# Patient Record
Sex: FEMALE | Race: WHITE | NOT HISPANIC OR LATINO | Employment: FULL TIME | URBAN - METROPOLITAN AREA
[De-identification: names, ages, dates, MRNs, and addresses within clinical notes are randomized per-mention and may not be internally consistent; named-entity substitution may affect disease eponyms.]

---

## 2017-02-13 RX ORDER — CITALOPRAM 20 MG/1
TABLET, FILM COATED ORAL
Qty: 90 TABLET | Refills: 0 | OUTPATIENT
Start: 2017-02-13

## 2017-02-20 RX ORDER — CITALOPRAM 20 MG/1
TABLET, FILM COATED ORAL
Qty: 90 TABLET | Refills: 0 | Status: SHIPPED | OUTPATIENT
Start: 2017-02-20 | End: 2017-05-22 | Stop reason: SDUPTHER

## 2017-02-20 NOTE — TELEPHONE ENCOUNTER
----- Message from Sana Vidal sent at 2/17/2017  3:32 PM CST -----  Contact: Self  Former Pt of  is calling in regards of seeing if she can get her  CELEXA filled until she comes in 03/13/2017. The pt uses [facility] on [address]. The pt can be reached at [phone]. Thanks KG

## 2017-02-20 NOTE — TELEPHONE ENCOUNTER
Left message to patient to call the office at [phone] to informed patient medication was sent to the pharmacy.

## 2017-02-27 ENCOUNTER — PATIENT MESSAGE (OUTPATIENT)
Dept: OBSTETRICS AND GYNECOLOGY | Facility: CLINIC | Age: 27
End: 2017-02-27

## 2017-03-21 ENCOUNTER — PATIENT MESSAGE (OUTPATIENT)
Dept: DERMATOLOGY | Facility: CLINIC | Age: 27
End: 2017-03-21

## 2017-04-06 ENCOUNTER — TELEPHONE (OUTPATIENT)
Dept: OPTOMETRY | Facility: CLINIC | Age: 27
End: 2017-04-06

## 2017-04-06 NOTE — TELEPHONE ENCOUNTER
----- Message from eHlen Palacios sent at 4/5/2017 12:55 PM CDT -----  Contact: Father   Pt father calling with regard to his daughter and getting a detailed letter for his insurance company about his daughters condition; that will allow her to receive coverage over the age of 26.  She was born with strabismus and can not drive so he is trying to send an appeal on that ground.  He wants us to state in the letter her condition and the length of time she has had it and her vision with and without the corrective lenses.  He can be reached at [phone] ([facility])

## 2017-05-09 RX ORDER — CITALOPRAM 20 MG/1
TABLET, FILM COATED ORAL
Qty: 90 TABLET | Refills: 0 | OUTPATIENT
Start: 2017-05-09

## 2017-05-22 ENCOUNTER — OFFICE VISIT (OUTPATIENT)
Dept: OBSTETRICS AND GYNECOLOGY | Facility: CLINIC | Age: 27
End: 2017-05-22
Payer: COMMERCIAL

## 2017-05-22 ENCOUNTER — PATIENT MESSAGE (OUTPATIENT)
Dept: OBSTETRICS AND GYNECOLOGY | Facility: CLINIC | Age: 27
End: 2017-05-22

## 2017-05-22 VITALS
HEIGHT: 63 IN | WEIGHT: 121.25 LBS | SYSTOLIC BLOOD PRESSURE: 112 MMHG | DIASTOLIC BLOOD PRESSURE: 62 MMHG | BODY MASS INDEX: 21.48 KG/M2

## 2017-05-22 DIAGNOSIS — N92.6 IRREGULAR PERIODS: Primary | ICD-10-CM

## 2017-05-22 DIAGNOSIS — F32.A DEPRESSION, UNSPECIFIED DEPRESSION TYPE: ICD-10-CM

## 2017-05-22 DIAGNOSIS — Z01.419 VISIT FOR GYNECOLOGIC EXAMINATION: Primary | ICD-10-CM

## 2017-05-22 PROCEDURE — 99395 PREV VISIT EST AGE 18-39: CPT | Mod: S$GLB,,, | Performed by: NURSE PRACTITIONER

## 2017-05-22 PROCEDURE — 99999 PR PBB SHADOW E&M-EST. PATIENT-LVL III: CPT | Mod: PBBFAC,,, | Performed by: NURSE PRACTITIONER

## 2017-05-22 PROCEDURE — 88175 CYTOPATH C/V AUTO FLUID REDO: CPT

## 2017-05-22 RX ORDER — MEDROXYPROGESTERONE ACETATE 10 MG/1
10 TABLET ORAL DAILY
Qty: 10 TABLET | Refills: 0 | Status: SHIPPED | OUTPATIENT
Start: 2017-05-22 | End: 2018-07-23

## 2017-05-22 RX ORDER — CITALOPRAM 20 MG/1
TABLET, FILM COATED ORAL
Qty: 90 TABLET | Refills: 4 | Status: SHIPPED | OUTPATIENT
Start: 2017-05-22 | End: 2018-07-23 | Stop reason: SDUPTHER

## 2017-05-22 NOTE — PROGRESS NOTES
HISTORY OF PRESENT ILLNESS:    Kiley Vyas is a 27 y.o. female, ., Patient's last menstrual period was 2017 (approximate).,  presents for a routine exam and Celexa refills for depression (initiated by Dr Dotson).  -No longer on OCPs as she is currently not sexually active.     Past Medical History:   Diagnosis Date    Depression     Seborrheic dermatitis     Strabismus        Past Surgical History:   Procedure Laterality Date    STRABISMUS SURGERY  1994    Denervation and extirpation of IO ou    STRABISMUS SURGERY  2010    Recession LR OD 6mm, Resection MR OD 5mm       MEDICATIONS AND ALLERGIES:      Current Outpatient Prescriptions:     citalopram (CELEXA) 20 MG tablet, TAKE 1 TABLET(20 MG) BY MOUTH EVERY DAY, Disp: 90 tablet, Rfl: 4    clobetasol (TEMOVATE) 0.05 % external solution, Use hs prn, Disp: 25 mL, Rfl: 3    econazole nitrate 1 % cream, Apply topically 2 (two) times daily., Disp: 30 g, Rfl: 3    fluocinolone acetonide oil (DERMOTIC OIL) 0.01 % Drop, Place 1 drop in ear(s) once daily., Disp: 20 mL, Rfl: 0    fluocinonide (LIDEX) 0.05 % external solution, Use on scalp hs, Disp: 60 mL, Rfl: 3    tazarotene (TAZORAC) 0.05 % Crea cream, Apply topically every evening., Disp: 30 g, Rfl: 3    Review of patient's allergies indicates:  No Known Allergies    Family History   Problem Relation Age of Onset    Colon cancer Mother 51    Diabetes Paternal Grandfather     Cataracts Maternal Grandmother     Breast cancer Neg Hx     Ovarian cancer Neg Hx        Social History     Social History    Marital status: Single     Spouse name: N/A    Number of children: N/A    Years of education: N/A     Occupational History    Not on file.     Social History Main Topics    Smoking status: Never Smoker    Smokeless tobacco: Never Used    Alcohol use Yes    Drug use: No    Sexual activity: Not Currently     Partners: Male     Birth control/ protection: None     Other Topics Concern  "   Not on file     Social History Narrative    No narrative on file       OB HISTORY: None.     COMPREHENSIVE GYN HISTORY:  PAP History: Denies abnormal Paps. LAST PAP  11-28-14 NORMAL.  Infection History: Denies STDs. Denies PID.  Benign History: Denies uterine fibroids. Denies ovarian cysts. Denies endometriosis. Denies other conditions.  Cancer History: Denies cervical cancer. Denies uterine cancer or hyperplasia. Denies ovarian cancer. Denies vulvar cancer or pre-cancer. Denies vaginal cancer or pre-cancer. Denies breast cancer. Denies colon cancer.  Sexual Activity History: Denies currently being sexually active  Menstrual History: Monthly. Mod then light flow.   Dysmenorrhea History: Reports mild dysmenorrhea.   Contraception: N/A.    ROS:  GENERAL: No weight changes. No swelling. No fatigue. No fever.  CARDIOVASCULAR: No chest pain. No shortness of breath. No leg cramps.   NEUROLOGICAL: No headaches. No vision changes.  BREASTS: No pain. No lumps. No discharge.  ABDOMEN: No pain. No nausea. No vomiting. No diarrhea. No constipation.  REPRODUCTIVE: No abnormal bleeding.   VULVA: No pain. No lesions. No itching.  VAGINA: No relaxation. No itching. No odor. No discharge. No lesions.  URINARY: No incontinence. No nocturia. No frequency. No dysuria.    /62   Ht 5' 3" (1.6 m)   Wt 55 kg (121 lb 4.1 oz)   LMP 04/25/2017 (Approximate)   BMI 21.48 kg/m²     PE:  APPEARANCE: Well nourished, well developed, in no acute distress.  AFFECT: WNL, alert and oriented x 3.  SKIN: No acne or hirsutism.  NECK: Neck symmetric, without masses or thyromegaly.  NODES: No inguinal, cervical, axillary or femoral lymph node enlargement.  CHEST: Good respiratory effort.   ABDOMEN: Soft. No tenderness or masses. No hepatosplenomegaly. No hernias.  BREASTS: Symmetrical, no skin changes, visible lesions, palpable masses or nipple discharge bilaterally.  PELVIC: External female genitalia without lesions.  Female hair " distribution. Adequate perineal body, Normal urethral meatus. Vagina moist and well rugated without lesions or discharge.  No significant cystocele or rectocele present. Cervix pink without lesions, discharge or tenderness. Uterus is 4-6 week size, regular, mobile and nontender. Adnexa without masses or tenderness.  EXTREMITIES: No edema    DIAGNOSIS:  1. Visit for gynecologic examination    2. Depression, unspecified depression type        PLAN:    Orders Placed This Encounter    Liquid-based pap smear, screening    citalopram (CELEXA) 20 MG tablet   Declined std tests    COUNSELING:  The patient was counseled today on:  -that if she changes her mind and wants back on OCPs to e-mail me for a prescription;  -Celexa use and potential side effectsl;  -A.C.S. Pap and pelvic exam guidelines (pap every 3 years);  -to follow up with her PCP for other health maintenance.    FOLLOW-UP with me or Dr Dotson annually.

## 2017-05-24 ENCOUNTER — PATIENT OUTREACH (OUTPATIENT)
Dept: ADMINISTRATIVE | Facility: HOSPITAL | Age: 27
End: 2017-05-24

## 2017-06-27 ENCOUNTER — TELEPHONE (OUTPATIENT)
Dept: DERMATOLOGY | Facility: CLINIC | Age: 27
End: 2017-06-27

## 2017-06-27 NOTE — TELEPHONE ENCOUNTER
Spoke to pt.Scheduled appt on 8/14 with Dr. Canas concerning a growth that does not go away after getting bitten by a bug.Sent reminder to pt.

## 2017-06-27 NOTE — TELEPHONE ENCOUNTER
----- Message from Ana Madrigallincoln sent at 6/27/2017  2:25 PM CDT -----  Contact: pts Father at [phone]  [place] pt-Pts father states that his daughter has a large growth on her arm that that was initially a bite.  The size of a dime.  Scratches it and it get irritated.  Wants it removed.  Mr. Vyas can be reached at [phone].  Wants sooner than September.  Thanks

## 2017-06-29 ENCOUNTER — TELEPHONE (OUTPATIENT)
Dept: DERMATOLOGY | Facility: CLINIC | Age: 27
End: 2017-06-29

## 2017-06-29 NOTE — TELEPHONE ENCOUNTER
----- Message from Silvia Keen RN sent at 6/29/2017  3:20 PM CDT -----  Contact: pts dad  Can you see if this patient can possibly be added in for July 11th? If not that's ok, just let me know. Thank you.     Silvia   ----- Message -----  From: Rachel Julien MA  Sent: 6/29/2017   1:23 PM  To: Silvia Keen RN    I spoke to pt, she was fine with the appt in August, but father is calling and concern about this area that is draining, do you know if Dr. Shultz have anything sooner.  ----- Message -----  From: Velia Watters  Sent: 6/29/2017   1:13 PM  To: Missael Valladares Staff    LINK-pt- pts dad is calling to speak with the nurse pt has a growth on her arm pts dad it was originally a bug bite pts dad said its draining and has clear fluid. PT has an appt in August pt needs to be seen before then. Can you please call pts dad at [phone]    ANTONIA

## 2017-06-29 NOTE — TELEPHONE ENCOUNTER
----- Message from Silvia Keen RN sent at 6/29/2017  3:20 PM CDT -----  Contact: pts dad  Can you see if this patient can possibly be added in for July 11th? If not that's ok, just let me know. Thank you.     Silvia   ----- Message -----  From: Rachel Julien MA  Sent: 6/29/2017   1:23 PM  To: Silvia Keen RN    I spoke to pt, she was fine with the appt in August, but father is calling and concern about this area that is draining, do you know if Dr. Shultz have anything sooner.  ----- Message -----  From: Velia Watters  Sent: 6/29/2017   1:13 PM  To: Missael Valladares Staff    LINK-pt- pts dad is calling to speak with the nurse pt has a growth on her arm pts dad it was originally a bug bite pts dad said its draining and has clear fluid. PT has an appt in August pt needs to be seen before then. Can you please call pts dad at [phone]    ANTONIA

## 2017-07-11 ENCOUNTER — OFFICE VISIT (OUTPATIENT)
Dept: DERMATOLOGY | Facility: CLINIC | Age: 27
End: 2017-07-11
Payer: COMMERCIAL

## 2017-07-11 VITALS — WEIGHT: 121 LBS | BODY MASS INDEX: 21.43 KG/M2

## 2017-07-11 DIAGNOSIS — L98.0 PYOGENIC GRANULOMA OF SKIN: Primary | ICD-10-CM

## 2017-07-11 PROCEDURE — 99999 PR PBB SHADOW E&M-EST. PATIENT-LVL III: CPT | Mod: PBBFAC,,, | Performed by: DERMATOLOGY

## 2017-07-11 PROCEDURE — 99213 OFFICE O/P EST LOW 20 MIN: CPT | Mod: S$GLB,,, | Performed by: DERMATOLOGY

## 2017-07-12 NOTE — PROGRESS NOTES
Subjective:       Patient ID:  Kiley Vyas is a 27 y.o. female who presents for   Chief Complaint   Patient presents with    Lesion     right arm      History of Present Illness: The patient presents with chief complaint of lesion.  Location: right arm  Duration: weeks  Signs/Symptoms: growing    Prior treatments: none    Her acne has resolved        Lesion         Review of Systems   Constitutional: Negative for fever.   Skin: Negative for itching and rash.   Hematologic/Lymphatic: Does not bruise/bleed easily.        Objective:    Physical Exam   Skin:   Areas Examined (abnormalities noted in diagram):   Head / Face Inspection Performed  Neck Inspection Performed  Chest / Axilla Inspection Performed  RUE Inspected  LUE Inspection Performed                    Assessment / Plan:        Pyogenic granuloma of skin        olux e to reduce size  Call to schedule removal

## 2017-07-31 ENCOUNTER — TELEPHONE (OUTPATIENT)
Dept: DERMATOLOGY | Facility: CLINIC | Age: 27
End: 2017-07-31

## 2017-07-31 ENCOUNTER — PATIENT MESSAGE (OUTPATIENT)
Dept: DERMATOLOGY | Facility: CLINIC | Age: 27
End: 2017-07-31

## 2017-08-03 ENCOUNTER — OFFICE VISIT (OUTPATIENT)
Dept: SURGERY | Facility: CLINIC | Age: 27
End: 2017-08-03
Payer: COMMERCIAL

## 2017-08-03 VITALS
HEIGHT: 64 IN | BODY MASS INDEX: 19.97 KG/M2 | SYSTOLIC BLOOD PRESSURE: 128 MMHG | TEMPERATURE: 98 F | WEIGHT: 117 LBS | HEART RATE: 89 BPM | DIASTOLIC BLOOD PRESSURE: 82 MMHG

## 2017-08-03 DIAGNOSIS — L98.0 PYOGENIC GRANULOMA: ICD-10-CM

## 2017-08-03 PROCEDURE — 99202 OFFICE O/P NEW SF 15 MIN: CPT | Mod: S$GLB,,, | Performed by: SURGERY

## 2017-08-03 PROCEDURE — 3008F BODY MASS INDEX DOCD: CPT | Mod: S$GLB,,, | Performed by: SURGERY

## 2017-08-03 PROCEDURE — 99999 PR PBB SHADOW E&M-EST. PATIENT-LVL III: CPT | Mod: PBBFAC,,, | Performed by: SURGERY

## 2017-08-03 PROCEDURE — 88305 TISSUE EXAM BY PATHOLOGIST: CPT | Performed by: PATHOLOGY

## 2017-08-03 NOTE — PROGRESS NOTES
History & Physical    SUBJECTIVE:     History of Present Illness:  Patient is a 27 y.o. female presents with pyogenic granuloma of the arm.  It has been there since a bite 8 months ago.  She has tried topical medication without relief.  She is a .      Chief Complaint   Patient presents with    Cyst       Review of patient's allergies indicates:  No Known Allergies    Current Outpatient Prescriptions   Medication Sig Dispense Refill    citalopram (CELEXA) 20 MG tablet TAKE 1 TABLET(20 MG) BY MOUTH EVERY DAY 90 tablet 4    clobetasol (TEMOVATE) 0.05 % external solution Use hs prn 25 mL 3    econazole nitrate 1 % cream Apply topically 2 (two) times daily. 30 g 3    fluocinolone acetonide oil (DERMOTIC OIL) 0.01 % Drop Place 1 drop in ear(s) once daily. 20 mL 0    fluocinonide (LIDEX) 0.05 % external solution Use on scalp hs 60 mL 3    medroxyPROGESTERone (PROVERA) 10 MG tablet Take 1 tablet (10 mg total) by mouth once daily. 10 tablet 0    tazarotene (TAZORAC) 0.05 % Crea cream Apply topically every evening. 30 g 3     No current facility-administered medications for this visit.        Past Medical History:   Diagnosis Date    Depression     Seborrheic dermatitis     Strabismus      Past Surgical History:   Procedure Laterality Date    STRABISMUS SURGERY  05/06/1994    Denervation and extirpation of IO ou    STRABISMUS SURGERY  05/12/2010    Recession LR OD 6mm, Resection MR OD 5mm     Family History   Problem Relation Age of Onset    Colon cancer Mother 51    Diabetes Paternal Grandfather     Cataracts Maternal Grandmother     Breast cancer Neg Hx     Ovarian cancer Neg Hx      Social History   Substance Use Topics    Smoking status: Never Smoker    Smokeless tobacco: Never Used    Alcohol use Yes        Review of Systems:  Review of Systems   Constitutional: Negative for fever.   Respiratory: Negative for chest tightness.    Cardiovascular: Negative for chest pain.   Hematological:  "Does not bruise/bleed easily.       OBJECTIVE:     Vital Signs (Most Recent)  Temp: 98.3 °F (36.8 °C) (08/03/17 0910)  Pulse: 89 (08/03/17 0910)  BP: 128/82 (08/03/17 0910)  5' 4" (1.626 m)  53.1 kg (117 lb)     Physical Exam:  Physical Exam   Constitutional: She appears well-developed and well-nourished.   Cardiovascular: Normal rate, regular rhythm and normal heart sounds.    Pulmonary/Chest: Effort normal and breath sounds normal.   Skin:        Vitals reviewed.      Laboratory  None recent    Diagnostic Results:  None applicable    ASSESSMENT/PLAN:     pyogenic granuloma    PLAN:Plan     For excision       "

## 2017-08-03 NOTE — LETTER
[facility] - General Surgery  [address]  [place] [address]  Phone: [phone] August 3, 2017      Yulissa Shultz MD  [address] [facility] [address]    Patient: Kiley Vyas   MR Number: [id number]   YOB: 1990   Date of Visit: 8/3/2017     Dear Dr. Shultz:    Thank you for referring Kiley Vyas to me for evaluation. Below are the relevant portions of my assessment and plan of care.    ASSESSMENT: Pyogenic granuloma     PLAN: For excision     If you have questions, please do not hesitate to call me. I look forward to following Kiley along with you.    Sincerely,      Jerry Natarajan MD   Section Head - General, Laparoscopic, Bariatric  Acute Care and Oncologic Surgery   - Surgical Weight Loss Program  [facility]    ALISTAIR/jelani

## 2017-08-03 NOTE — PROGRESS NOTES
SUBJECTIVE:   Kiley Vyas is a 27 y.o. female who presents for lesion removal. We have already discussed this procedure, including option of not performing surgery, technique of surgery and potential for scarring at a recent visit.    OBJECTIVE:   Patient appears well. Vitals are normal.  Skin: beefy red granulation tissue on right forarm    ASSESSMENT:   Pyogenic granuloma    PLAN:   After informed consent was obtained, using Betadine for cleansing and 1% Lidocaine without epinephrine for anesthetic, with sterile technique, elliptical excision in total was performed. 3 cm incision . Sterile dressing is applied, and wound care instructions provided.  Be alert for any signs of cutaneous infection. The procedure was well tolerated without complications. Follow up: the specimen is labeled and sent to pathology for evaluation.

## 2017-08-07 ENCOUNTER — PATIENT MESSAGE (OUTPATIENT)
Dept: SURGERY | Facility: CLINIC | Age: 27
End: 2017-08-07

## 2017-08-07 ENCOUNTER — TELEPHONE (OUTPATIENT)
Dept: SURGERY | Facility: CLINIC | Age: 27
End: 2017-08-07

## 2017-08-07 NOTE — TELEPHONE ENCOUNTER
Pt's mother called to report that she has pain, swelling, redness around her incision site.  She is s/p removal of arm cyst from 8/3/17.  She will send a photo through [employer] for review.

## 2017-08-07 NOTE — TELEPHONE ENCOUNTER
----- Message from Quique Summers sent at 8/7/2017 10:25 AM CDT -----  Contact: Miriam-Mom  Caller said pt is in a lot of pain. Caller said pt's arm is a lil swollen and red. Please call regarding this at [phone]

## 2017-08-08 ENCOUNTER — OFFICE VISIT (OUTPATIENT)
Dept: SURGERY | Facility: CLINIC | Age: 27
End: 2017-08-08
Payer: COMMERCIAL

## 2017-08-08 VITALS
HEIGHT: 64 IN | BODY MASS INDEX: 19.46 KG/M2 | HEART RATE: 72 BPM | WEIGHT: 114 LBS | SYSTOLIC BLOOD PRESSURE: 102 MMHG | DIASTOLIC BLOOD PRESSURE: 58 MMHG | TEMPERATURE: 99 F

## 2017-08-08 DIAGNOSIS — Z09 POSTOP CHECK: Primary | ICD-10-CM

## 2017-08-08 PROBLEM — L98.0 PYOGENIC GRANULOMA: Status: RESOLVED | Noted: 2017-08-03 | Resolved: 2017-08-08

## 2017-08-08 PROCEDURE — 99999 PR PBB SHADOW E&M-EST. PATIENT-LVL III: CPT | Mod: PBBFAC,,, | Performed by: SURGERY

## 2017-08-08 PROCEDURE — 99024 POSTOP FOLLOW-UP VISIT: CPT | Mod: S$GLB,,, | Performed by: SURGERY

## 2017-08-08 NOTE — PROGRESS NOTES
"Kiley Vyas is a 27 y.o. female patient.   No diagnosis found.  Past Medical History:   Diagnosis Date    Depression     Seborrheic dermatitis     Strabismus      No past surgical history pertinent negatives on file.  Scheduled Meds:  Continuous Infusions:  PRN Meds:    Review of patient's allergies indicates:  No Known Allergies  There are no hospital problems to display for this patient.    Blood pressure (!) 102/58, pulse 72, temperature 98.6 °F (37 °C), height 5' 4" (1.626 m), weight 51.7 kg (114 lb).    Subjective S/p arm excision 8/3/17.  She is having some drainage from the wound.  Objective No infection but the center of the wound has opened, likely from tension.   Assessment & Plan Dry dressing daily.  Rtc one week.  May shower but don't bathe.       Jerry Natarajan MD  8/8/2017  "

## 2017-08-08 NOTE — LETTER
[facility] - General Surgery  [address]  [place] [address]  Phone: [phone] August 8, 2017      Yulissa Shultz MD  [address] [facility] [address]    Patient: Kiley Vyas   MR Number: [id number]   YOB: 1990   Date of Visit: 8/8/2017     Dear Dr. Shultz:    Thank you for referring Kiley Vyas to me for evaluation. Below are the relevant portions of my assessment and plan of care.    Patient is status post arm excision 8/3/17.  She is having some drainage from the wound.    PLAN:  -  Dry dressing daily  -  RTC in one week  -  May shower but do not bathe.    If you have questions, please do not hesitate to call me. I look forward to following Kiley along with you.    Sincerely,      Jerry Natarajan MD   Section Head - General, Laparoscopic, Bariatric  Acute Care and Oncologic Surgery   - Surgical Weight Loss Program  [facility]    WSR/jelani    CC  Gaby Hernandez MD

## 2017-08-17 ENCOUNTER — OFFICE VISIT (OUTPATIENT)
Dept: SURGERY | Facility: CLINIC | Age: 27
End: 2017-08-17
Payer: COMMERCIAL

## 2017-08-17 VITALS
SYSTOLIC BLOOD PRESSURE: 116 MMHG | DIASTOLIC BLOOD PRESSURE: 75 MMHG | WEIGHT: 116.88 LBS | HEART RATE: 65 BPM | TEMPERATURE: 98 F | HEIGHT: 63 IN | BODY MASS INDEX: 20.71 KG/M2

## 2017-08-17 DIAGNOSIS — Z09 POSTOP CHECK: Primary | ICD-10-CM

## 2017-08-17 PROCEDURE — 99999 PR PBB SHADOW E&M-EST. PATIENT-LVL III: CPT | Mod: PBBFAC,,, | Performed by: SURGERY

## 2017-08-17 PROCEDURE — 99024 POSTOP FOLLOW-UP VISIT: CPT | Mod: S$GLB,,, | Performed by: SURGERY

## 2017-08-17 NOTE — PROGRESS NOTES
"Kiley Vyas is a 27 y.o. female patient.   1. Postop check      Past Medical History:   Diagnosis Date    Depression     Seborrheic dermatitis     Strabismus      No past surgical history pertinent negatives on file.  Scheduled Meds:  Continuous Infusions:  PRN Meds:    Review of patient's allergies indicates:  No Known Allergies  There are no hospital problems to display for this patient.    Blood pressure 116/75, pulse 65, temperature 98.1 °F (36.7 °C), height 5' 3" (1.6 m), weight 53 kg (116 lb 13.5 oz).    Subjective S/p right forearm excision 8/3/17.  She has no complaints.  Objective Wound is improving.  0.5 cm area of granulation tissue in the healed incision, application of silvernitrate.   Assessment & Plan Doing well after excision.  Rtc prn.  Pathology pending.       Jerry Natarajan MD  8/17/2017  "

## 2017-08-18 ENCOUNTER — TELEPHONE (OUTPATIENT)
Dept: SURGERY | Facility: CLINIC | Age: 27
End: 2017-08-18

## 2017-08-18 NOTE — TELEPHONE ENCOUNTER
----- Message from Jerry Natarajan MD sent at 8/18/2017  6:30 AM CDT -----  Please let her know the lesion was as expected on pathology:    FINAL PATHOLOGIC DIAGNOSIS  1. Skin, right forearm, excision:  - Pyogenic granuloma.

## 2017-08-18 NOTE — TELEPHONE ENCOUNTER
Notified pt of normal path results per Dr. Natarajan.  Pt verbalizes understanding.    She also has concerns about the area that was cauterized in clinic yesterday.  She states the area fell after her shower.  Notified pt that its ok, just to keep an eye on the area and if she gets concerned to please give us a call back.  Pt is ok with plan.

## 2018-03-26 ENCOUNTER — OFFICE VISIT (OUTPATIENT)
Dept: DERMATOLOGY | Facility: CLINIC | Age: 28
End: 2018-03-26
Payer: COMMERCIAL

## 2018-03-26 DIAGNOSIS — L40.8 SEBOPSORIASIS: Primary | ICD-10-CM

## 2018-03-26 DIAGNOSIS — L98.9 DISEASE OF SKIN AND SUBCUTANEOUS TISSUE: ICD-10-CM

## 2018-03-26 PROCEDURE — 99213 OFFICE O/P EST LOW 20 MIN: CPT | Mod: 25,S$GLB,, | Performed by: DERMATOLOGY

## 2018-03-26 PROCEDURE — 99999 PR PBB SHADOW E&M-EST. PATIENT-LVL II: CPT | Mod: PBBFAC,,, | Performed by: DERMATOLOGY

## 2018-03-26 PROCEDURE — 11900 INJECT SKIN LESIONS </W 7: CPT | Mod: S$GLB,,, | Performed by: DERMATOLOGY

## 2018-03-26 RX ORDER — BETAMETHASONE DIPROPIONATE 0.5 MG/G
CREAM TOPICAL
Qty: 60 G | Refills: 3 | Status: SHIPPED | OUTPATIENT
Start: 2018-03-26 | End: 2020-09-30

## 2018-03-26 NOTE — PROGRESS NOTES
Subjective:       Patient ID:  Kiley Vyas is a 27 y.o. female who presents for   Chief Complaint   Patient presents with    Psoriasis     scalp both inner ears under L arm x few years-3-4 itchy Rx-soln cream -helps     Psoriasis  - Follow-up  Currently using: last seen by [facility] 7/17 - treating with lidex soln scalp and dermotic drops ears.  Affected locations: left axilla, left ear, right ear and scalp  Signs / symptoms: itching        Review of Systems   Constitutional: Negative for fever.   HENT: Negative for sore throat.    Musculoskeletal: Negative for arthralgias.   Skin: Positive for itching and rash.        Objective:    Physical Exam   Constitutional: She appears well-developed and well-nourished. No distress.   Neurological: She is alert and oriented to person, place, and time. She is not disoriented.   Psychiatric: She has a normal mood and affect.   Skin:   Areas Examined (abnormalities noted in diagram):   Scalp / Hair Palpated and Inspected  Head / Face Inspection Performed  Neck Inspection Performed  Chest / Axilla Inspection Performed  Back Inspection Performed  RUE Inspected  LUE Inspection Performed  RLE Inspected  LLE Inspection Performed  Nails and Digits Inspection Performed                       Diagram Legend     Erythematous scaling macule/papule c/w actinic keratosis       Vascular papule c/w angioma      Pigmented verrucoid papule/plaque c/w seborrheic keratosis      Yellow umbilicated papule c/w sebaceous hyperplasia      Irregularly shaped tan macule c/w lentigo     1-2 mm smooth white papules consistent with Milia      Movable subcutaneous cyst with punctum c/w epidermal inclusion cyst      Subcutaneous movable cyst c/w pilar cyst      Firm pink to brown papule c/w dermatofibroma      Pedunculated fleshy papule(s) c/w skin tag(s)      Evenly pigmented macule c/w junctional nevus     Mildly variegated pigmented, slightly irregular-bordered macule c/w mildly atypical nevus      Flesh  colored to evenly pigmented papule c/w intradermal nevus       Pink pearly papule/plaque c/w basal cell carcinoma      Erythematous hyperkeratotic cursted plaque c/w SCC      Surgical scar with no sign of skin cancer recurrence      Open and closed comedones      Inflammatory papules and pustules      Verrucoid papule consistent consistent with wart     Erythematous eczematous patches and plaques     Dystrophic onycholytic nail with subungual debris c/w onychomycosis     Umbilicated papule    Erythematous-base heme-crusted tan verrucoid plaque consistent with inflamed seborrheic keratosis     Erythematous Silvery Scaling Plaque c/w Psoriasis     See annotation      Assessment / Plan:        Sebopsoriasis  Intralesional Kenalog 5mg/cc (0.5 cc total) injected into 1 lesions on the post scalp today after obtaining verbal consent including risk of surrounding hypopigmentation. Patient tolerated procedure well.    Units: 1  NDC for Kenalog 10mg/cc:  1453-0543-64  Change to diprolene cream bid to left ear    Disease of skin and subcutaneous tissue - ? LSC vs nummular eczema vs other  Diprolene cream bid  Intralesional Kenalog 5mg/cc (1.5 cc total) injected into 1 lesions on the left upper inner arm today after obtaining verbal consent including risk of surrounding hypopigmentation. Patient tolerated procedure well.    Units: 1  NDC for Kenalog 10mg/cc:  5770-9055-95                     Follow-up in about 4 weeks (around 4/23/2018).

## 2018-03-29 ENCOUNTER — TELEPHONE (OUTPATIENT)
Dept: DERMATOLOGY | Facility: CLINIC | Age: 28
End: 2018-03-29

## 2018-03-29 NOTE — TELEPHONE ENCOUNTER
Spoke to patient.pt still complaining about an area that was seen on 3/26 was treated that day by the provider with IL;s which did help that day but still itchy now, advise pt that she may use the clob soln that pt has already at home, pt verbalized understanding.

## 2018-05-28 ENCOUNTER — OFFICE VISIT (OUTPATIENT)
Dept: DERMATOLOGY | Facility: CLINIC | Age: 28
End: 2018-05-28
Payer: COMMERCIAL

## 2018-05-28 DIAGNOSIS — L40.8 SEBOPSORIASIS: Primary | ICD-10-CM

## 2018-05-28 PROCEDURE — 11900 INJECT SKIN LESIONS </W 7: CPT | Mod: S$GLB,,, | Performed by: DERMATOLOGY

## 2018-05-28 PROCEDURE — 99999 PR PBB SHADOW E&M-EST. PATIENT-LVL II: CPT | Mod: PBBFAC,,, | Performed by: DERMATOLOGY

## 2018-05-28 PROCEDURE — 99213 OFFICE O/P EST LOW 20 MIN: CPT | Mod: 25,S$GLB,, | Performed by: DERMATOLOGY

## 2018-05-28 NOTE — PROGRESS NOTES
Subjective:       Patient ID:  Kiley Vyas is a 28 y.o. female who presents for   Chief Complaint   Patient presents with    Psoriasis     improving, scalp still very itchy still using Rx-soln which helps at times     Kiley Vyas is a 28 y.o. Female who presents for follow-up of psoriasis, last seen 3/26/2018.    Psoriasis: 0.5 cc of ILK 5mg/cc posterior scalp injected at the last appointment.   Current treatment: betamethasone 0.05% cream, clobetasol propionate 0.0% solution.   Areas of the involvement: behind and in the ear, scalp  Signs and symptoms: itching  Area that bothers her the most is her scalp psoriasis, present predominantly on the right scalp.     At last appointment, 1.5 cc of ILK 5mg/cc was injected into the left upper inner arm for suspected LSC vs nummular eczema.  Patient reports applying topical betamethasone to the site since her last appointment. Denies any issues with the site today.     Does not affect sleep  + affects QOL - petar at work 2/2 scratching (self conscious)  + PCP/OB/GYN          Review of Systems   Constitutional: Negative for fever.   HENT: Negative for sore throat.    Gastrointestinal: Negative for diarrhea.   Musculoskeletal: Negative for arthralgias.   Skin: Positive for itching (scalp 7/10) and rash.        Objective:    Physical Exam   Constitutional: She appears well-developed and well-nourished. No distress.   Neurological: She is alert and oriented to person, place, and time. She is not disoriented.   Psychiatric: She has a normal mood and affect.   Skin:   Areas Examined (abnormalities noted in diagram):   Scalp / Hair Palpated and Inspected  Head / Face Inspection Performed  RUE Inspected  LUE Inspection Performed  Nails and Digits Inspection Performed                       Diagram Legend     Erythematous scaling macule/papule c/w actinic keratosis       Vascular papule c/w angioma      Pigmented verrucoid papule/plaque c/w seborrheic keratosis      Yellow  umbilicated papule c/w sebaceous hyperplasia      Irregularly shaped tan macule c/w lentigo     1-2 mm smooth white papules consistent with Milia      Movable subcutaneous cyst with punctum c/w epidermal inclusion cyst      Subcutaneous movable cyst c/w pilar cyst      Firm pink to brown papule c/w dermatofibroma      Pedunculated fleshy papule(s) c/w skin tag(s)      Evenly pigmented macule c/w junctional nevus     Mildly variegated pigmented, slightly irregular-bordered macule c/w mildly atypical nevus      Flesh colored to evenly pigmented papule c/w intradermal nevus       Pink pearly papule/plaque c/w basal cell carcinoma      Erythematous hyperkeratotic cursted plaque c/w SCC      Surgical scar with no sign of skin cancer recurrence      Open and closed comedones      Inflammatory papules and pustules      Verrucoid papule consistent consistent with wart     Erythematous eczematous patches and plaques     Dystrophic onycholytic nail with subungual debris c/w onychomycosis     Umbilicated papule    Erythematous-base heme-crusted tan verrucoid plaque consistent with inflamed seborrheic keratosis     Erythematous Silvery Scaling Plaque c/w Psoriasis     See annotation      Assessment / Plan:        Sebopsoriasis  Cont beta dip for left ear bid prn  Cont clob soln bid to AA scalp bid  D/c manipulation - use ice for itching    Intralesional Kenalog 5mg/cc (2.0 cc total) injected into 1 lesions on the right post scalp today after obtaining verbal consent including risk of surrounding hypopigmentation. Patient tolerated procedure well.    Units: 1  NDC for Kenalog 10mg/cc:  3952-3189-91                     Follow-up in about 3 months (around 8/28/2018).

## 2018-07-23 ENCOUNTER — OFFICE VISIT (OUTPATIENT)
Dept: OBSTETRICS AND GYNECOLOGY | Facility: CLINIC | Age: 28
End: 2018-07-23
Payer: COMMERCIAL

## 2018-07-23 VITALS
BODY MASS INDEX: 21.64 KG/M2 | HEIGHT: 63 IN | WEIGHT: 122.13 LBS | SYSTOLIC BLOOD PRESSURE: 112 MMHG | DIASTOLIC BLOOD PRESSURE: 76 MMHG

## 2018-07-23 DIAGNOSIS — F32.A DEPRESSION, UNSPECIFIED DEPRESSION TYPE: ICD-10-CM

## 2018-07-23 DIAGNOSIS — Z01.419 WELL WOMAN EXAM WITH ROUTINE GYNECOLOGICAL EXAM: Primary | ICD-10-CM

## 2018-07-23 PROCEDURE — 99999 PR PBB SHADOW E&M-EST. PATIENT-LVL III: CPT | Mod: PBBFAC,,, | Performed by: NURSE PRACTITIONER

## 2018-07-23 PROCEDURE — 99395 PREV VISIT EST AGE 18-39: CPT | Mod: S$GLB,,, | Performed by: NURSE PRACTITIONER

## 2018-07-23 RX ORDER — CITALOPRAM 20 MG/1
TABLET, FILM COATED ORAL
Qty: 90 TABLET | Refills: 4 | Status: SHIPPED | OUTPATIENT
Start: 2018-07-23 | End: 2019-10-29 | Stop reason: SDUPTHER

## 2018-07-23 NOTE — PROGRESS NOTES
HISTORY OF PRESENT ILLNESS:    Kiley Vyas is a 28 y.o. female, , Patient's last menstrual period was 07/15/2018.,  presents for a routine exam and RF of Celexa.  -Reports periods are monthly, not heavy and cramping is mild.     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    Seborrheic dermatitis     Strabismus        Past Surgical History:   Procedure Laterality Date    STRABISMUS SURGERY  1994    Denervation and extirpation of IO ou    STRABISMUS SURGERY  2010    Recession LR OD 6mm, Resection MR OD 5mm       MEDICATIONS AND ALLERGIES:      Current Outpatient Prescriptions:     betamethasone dipropionate (DIPROLENE) 0.05 % cream, AAA left ear and left upper inner arm bid, Disp: 60 g, Rfl: 3    citalopram (CELEXA) 20 MG tablet, TAKE 1 TABLET(20 MG) BY MOUTH EVERY DAY, Disp: 90 tablet, Rfl: 4    clobetasol (TEMOVATE) 0.05 % external solution, Use hs prn, Disp: 25 mL, Rfl: 3    econazole nitrate 1 % cream, Apply topically 2 (two) times daily., Disp: 30 g, Rfl: 3    fluocinolone acetonide oil (DERMOTIC OIL) 0.01 % Drop, Place 1 drop in ear(s) once daily., Disp: 20 mL, Rfl: 0    fluocinonide (LIDEX) 0.05 % external solution, Use on scalp hs, Disp: 60 mL, Rfl: 3    tazarotene (TAZORAC) 0.05 % Crea cream, Apply topically every evening., Disp: 30 g, Rfl: 3    Review of patient's allergies indicates:  No Known Allergies    Family History   Problem Relation Age of Onset    Colon cancer Mother 51    Diabetes Paternal Grandfather     Cataracts Maternal Grandmother     Breast cancer Neg Hx     Ovarian cancer Neg Hx        Social History     Social History    Marital status: Single     Spouse name: N/A    Number of children: N/A    Years of education: N/A     Occupational History    Not on file.     Social History Main Topics    Smoking status: Never Smoker    Smokeless tobacco: Never Used    Alcohol use Yes    Drug use: No    Sexual activity: Not  "Currently     Partners: Male     Birth control/ protection: None     Other Topics Concern    Are You Pregnant Or Think You May Be? No    Breast-Feeding No     Social History Narrative    No narrative on file       OB HISTORY: None.      COMPREHENSIVE GYN HISTORY:  PAP History: Denies abnormal Paps. LAST PAP  5-22-17 NORMAL.  Infection History: Denies STDs. Denies PID.  Benign History: Denies uterine fibroids. Denies ovarian cysts. Denies endometriosis. Denies other conditions.  Cancer History: Denies cervical cancer. Denies uterine cancer or hyperplasia. Denies ovarian cancer. Denies vulvar cancer or pre-cancer. Denies vaginal cancer or pre-cancer. Denies breast cancer. Denies colon cancer.  Sexual Activity History: Denies currently being sexually active  Menstrual History: Monthly. Mod then light flow.   Dysmenorrhea History: Reports mild dysmenorrhea.   Contraception: N/A.    ROS:  GENERAL: No weight changes. No swelling. No fatigue. No fever.  CARDIOVASCULAR: No chest pain. No shortness of breath. No leg cramps.   NEUROLOGICAL: No headaches. No vision changes.  BREASTS: No pain. No lumps. No discharge.  ABDOMEN: No pain. No nausea. No vomiting. No diarrhea. No constipation.  REPRODUCTIVE: No abnormal bleeding.   VULVA: No pain. No lesions. No itching.  VAGINA: No relaxation. No itching. No odor. No discharge. No lesions.  URINARY: No incontinence. No nocturia. No frequency. No dysuria.    /76   Ht 5' 3" (1.6 m)   Wt 55.4 kg (122 lb 2.2 oz)   LMP 07/15/2018   BMI 21.64 kg/m²   5-22-17  lb 4.1 oz    PE:  APPEARANCE: Well nourished, well developed, in no acute distress.  AFFECT: WNL, alert and oriented x 3.  SKIN: No acne or hirsutism.  NECK: Neck symmetric, without masses or thyromegaly.  NODES: No inguinal, cervical, axillary or femoral lymph node enlargement.  CHEST: Good respiratory effort.   ABDOMEN: Soft. No tenderness or masses.  BREASTS: Symmetrical, no skin changes, visible lesions, " palpable masses or nipple discharge bilaterally.  PELVIC: External female genitalia without lesions.  Female hair distribution. Adequate perineal body, Normal urethral meatus. Vagina moist and well rugated without lesions or discharge.  No significant cystocele or rectocele present. Cervix pink without lesions, discharge or tenderness. Uterus is 4-6 week size, regular, mobile and nontender. Adnexa without masses or tenderness.  EXTREMITIES: No edema    DIAGNOSIS:  1. Well woman exam with routine gynecological exam    2. Depression, unspecified depression type        PLAN:    LABS AND TESTS ORDERED:  None    MEDICATIONS PRESCRIBED:  Celexa    COUNSELING:  The patient was counseled today on:  -A.C.S. Pap and pelvic exam guidelines (pap every 3 years);  -to follow up with her PCP for other health maintenance.    FOLLOW-UP annually.

## 2018-09-25 ENCOUNTER — OFFICE VISIT (OUTPATIENT)
Dept: DERMATOLOGY | Facility: CLINIC | Age: 28
End: 2018-09-25
Payer: COMMERCIAL

## 2018-09-25 DIAGNOSIS — L40.8 SEBOPSORIASIS: Primary | ICD-10-CM

## 2018-09-25 DIAGNOSIS — L98.9 DISEASE OF SKIN AND SUBCUTANEOUS TISSUE: ICD-10-CM

## 2018-09-25 PROCEDURE — 11900 INJECT SKIN LESIONS </W 7: CPT | Mod: S$GLB,,, | Performed by: NURSE PRACTITIONER

## 2018-09-25 PROCEDURE — 99999 PR PBB SHADOW E&M-EST. PATIENT-LVL II: CPT | Mod: PBBFAC,,, | Performed by: NURSE PRACTITIONER

## 2018-09-25 PROCEDURE — 99213 OFFICE O/P EST LOW 20 MIN: CPT | Mod: 25,S$GLB,, | Performed by: NURSE PRACTITIONER

## 2018-09-25 RX ORDER — KETOCONAZOLE 20 MG/ML
SHAMPOO, SUSPENSION TOPICAL
Qty: 120 ML | Refills: 5 | Status: SHIPPED | OUTPATIENT
Start: 2018-09-25 | End: 2020-06-08 | Stop reason: SDUPTHER

## 2018-09-25 RX ORDER — CLOBETASOL PROPIONATE 0.46 MG/ML
SOLUTION TOPICAL
Qty: 25 ML | Refills: 3 | Status: SHIPPED | OUTPATIENT
Start: 2018-09-25 | End: 2020-07-27 | Stop reason: SDUPTHER

## 2018-09-25 NOTE — PROGRESS NOTES
Subjective:       Patient ID:  Kiley Vyas is a 28 y.o. female who presents for   Chief Complaint   Patient presents with    Psoriasis     Psoriasis  - Follow-up  Symptom course: unchanged (Last seen by LINK 5/28/18)  Currently using: betamethasone ointment for ears, clobetasol soln. for scalp. s/p IL K5mg tp scalp w/ some short term relief, feels itching has returned.   Affected locations: scalp, right ear and left ear  Signs / symptoms: itching, burning and spreading  Severity: moderate    reports using clobetasol BID & continues to have itching to scalp      Review of Systems   Constitutional: Negative for fever.   HENT: Negative for sore throat.    Gastrointestinal: Negative for diarrhea.   Musculoskeletal: Negative for arthralgias.   Skin: Positive for itching, rash and activity-related sunscreen use. Negative for daily sunscreen use.        Objective:    Physical Exam   Constitutional: She appears well-developed and well-nourished. No distress.   Neurological: She is alert and oriented to person, place, and time. She is not disoriented.   Psychiatric: She has a normal mood and affect.   Skin:   Areas Examined (abnormalities noted in diagram):   Head / Face Inspection Performed  Neck Inspection Performed  Chest / Axilla Inspection Performed  Back Inspection Performed  RUE Inspected  LUE Inspection Performed                   Diagram Legend     Erythematous scaling macule/papule c/w actinic keratosis       Vascular papule c/w angioma      Pigmented verrucoid papule/plaque c/w seborrheic keratosis      Yellow umbilicated papule c/w sebaceous hyperplasia      Irregularly shaped tan macule c/w lentigo     1-2 mm smooth white papules consistent with Milia      Movable subcutaneous cyst with punctum c/w epidermal inclusion cyst      Subcutaneous movable cyst c/w pilar cyst      Firm pink to brown papule c/w dermatofibroma      Pedunculated fleshy papule(s) c/w skin tag(s)      Evenly pigmented macule c/w  junctional nevus     Mildly variegated pigmented, slightly irregular-bordered macule c/w mildly atypical nevus      Flesh colored to evenly pigmented papule c/w intradermal nevus       Pink pearly papule/plaque c/w basal cell carcinoma      Erythematous hyperkeratotic cursted plaque c/w SCC      Surgical scar with no sign of skin cancer recurrence      Open and closed comedones      Inflammatory papules and pustules      Verrucoid papule consistent consistent with wart     Erythematous eczematous patches and plaques     Dystrophic onycholytic nail with subungual debris c/w onychomycosis     Umbilicated papule    Erythematous-base heme-crusted tan verrucoid plaque consistent with inflamed seborrheic keratosis     Erythematous Silvery Scaling Plaque c/w Psoriasis     See annotation      Assessment / Plan:        Sebopsoriasis  -     ketoconazole (NIZORAL) 2 % shampoo; Wash hair with medicated shampoo at least 2x/week - let sit on scalp at least 5 minutes prior to rinsing  Dispense: 120 mL; Refill: 5  -     clobetasol (TEMOVATE) 0.05 % external solution; Use hs prn  Dispense: 25 mL; Refill: 3    Intralesional Kenalog 10mg/cc (0.8 cc total) injected into 1 lesions on the right sclp today after obtaining verbal consent including risk of surrounding hypopigmentation. Patient tolerated procedure well.    Units: 1  NDC for Kenalog 10mg/cc:  4775-4273-33    Disease of skin and subcutaneous tissue  Nummular eczema, probable  Vs. AD  Continue w/ Diprolene prn itching  Good skin care regimen discussed including limiting to one bath or shower/day, using lukewarm water with mild soap and moisturizing cream to skin 1 - 2x/day. Brochure was provided and reviewed with patient.  Recommend Cerave cream           Follow-up if symptoms worsen or fail to improve.

## 2019-07-30 ENCOUNTER — TELEPHONE (OUTPATIENT)
Dept: OBSTETRICS AND GYNECOLOGY | Facility: CLINIC | Age: 29
End: 2019-07-30

## 2019-07-30 ENCOUNTER — OFFICE VISIT (OUTPATIENT)
Dept: OBSTETRICS AND GYNECOLOGY | Facility: CLINIC | Age: 29
End: 2019-07-30
Payer: COMMERCIAL

## 2019-07-30 VITALS
HEIGHT: 63 IN | WEIGHT: 124.56 LBS | DIASTOLIC BLOOD PRESSURE: 79 MMHG | SYSTOLIC BLOOD PRESSURE: 118 MMHG | BODY MASS INDEX: 22.07 KG/M2

## 2019-07-30 DIAGNOSIS — Z01.419 WELL WOMAN EXAM WITH ROUTINE GYNECOLOGICAL EXAM: Primary | ICD-10-CM

## 2019-07-30 PROCEDURE — 99999 PR PBB SHADOW E&M-EST. PATIENT-LVL III: ICD-10-PCS | Mod: PBBFAC,,, | Performed by: NURSE PRACTITIONER

## 2019-07-30 PROCEDURE — 99999 PR PBB SHADOW E&M-EST. PATIENT-LVL III: CPT | Mod: PBBFAC,,, | Performed by: NURSE PRACTITIONER

## 2019-07-30 PROCEDURE — 99395 PREV VISIT EST AGE 18-39: CPT | Mod: S$GLB,,, | Performed by: NURSE PRACTITIONER

## 2019-07-30 PROCEDURE — 99395 PR PREVENTIVE VISIT,EST,18-39: ICD-10-PCS | Mod: S$GLB,,, | Performed by: NURSE PRACTITIONER

## 2019-07-30 NOTE — PROGRESS NOTES
HISTORY OF PRESENT ILLNESS:    Kiley Vyas is a 29 y.o. female, , Patient's last menstrual period was 2019 (exact date).,  presents for a routine exam and has no gyn complaints.  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    Seborrheic dermatitis     Strabismus        Past Surgical History:   Procedure Laterality Date    COLONOSCOPY N/A 2014    Performed by Jerad Townsend MD at [facility] (4[facility] F[facility])    STRABISMUS SURGERY  1994    Denervation and extirpation of IO ou    STRABISMUS SURGERY  2010    Recession LR OD 6mm, Resection MR OD 5mm       MEDICATIONS AND ALLERGIES:      Current Outpatient Medications:     betamethasone dipropionate (DIPROLENE) 0.05 % cream, AAA left ear and left upper inner arm bid, Disp: 60 g, Rfl: 3    citalopram (CELEXA) 20 MG tablet, TAKE 1 TABLET(20 MG) BY MOUTH EVERY DAY, Disp: 90 tablet, Rfl: 4    clobetasol (TEMOVATE) 0.05 % external solution, Use hs prn, Disp: 25 mL, Rfl: 3    ketoconazole (NIZORAL) 2 % shampoo, Wash hair with medicated shampoo at least 2x/week - let sit on scalp at least 5 minutes prior to rinsing, Disp: 120 mL, Rfl: 5    tazarotene (TAZORAC) 0.05 % Crea cream, Apply topically every evening., Disp: 30 g, Rfl: 3    Review of patient's allergies indicates:  No Known Allergies    Family History   Problem Relation Age of Onset    Colon cancer Mother 51    Diabetes Paternal Grandfather     Cataracts Maternal Grandmother     Breast cancer Neg Hx     Ovarian cancer Neg Hx        Social History     Socioeconomic History    Marital status: Single     Spouse name: Not on file    Number of children: Not on file    Years of education: Not on file    Highest education level: Not on file   Occupational History    Not on file   Social Needs    Financial resource strain:  Not on file    Food insecurity:     Worry: Not on file     Inability: Not on file    Transportation needs:     Medical: Not on file     Non-medical: Not on file   Tobacco Use    Smoking status: Never Smoker    Smokeless tobacco: Never Used   Substance and Sexual Activity    Alcohol use: Yes    Drug use: No    Sexual activity: Not Currently     Partners: Male     Birth control/protection: None   Lifestyle    Physical activity:     Days per week: Not on file     Minutes per session: Not on file    Stress: Not on file   Relationships    Social connections:     Talks on phone: Not on file     Gets together: Not on file     Attends Muslim service: Not on file     Active member of club or organization: Not on file     Attends meetings of clubs or organizations: Not on file     Relationship status: Not on file   Other Topics Concern    Are you pregnant or think you may be? No    Breast-feeding No   Social History Narrative    Not on file       OB HISTORY: None.      COMPREHENSIVE GYN HISTORY:  PAP History: Denies abnormal Paps. LAST PAP  5-22-17 NORMAL.  Infection History: Denies STDs. Denies PID.  Benign History: Denies uterine fibroids. Denies ovarian cysts. Denies endometriosis. Denies other conditions.  Cancer History: Denies cervical cancer. Denies uterine cancer or hyperplasia. Denies ovarian cancer. Denies vulvar cancer or pre-cancer. Denies vaginal cancer or pre-cancer. Denies breast cancer. Denies colon cancer.  Sexual Activity History: Denies EVER being sexually active  Menstrual History: Monthly. Mod then light flow.   Dysmenorrhea History: Reports mild dysmenorrhea.   Contraception: N/A.       ROS:  GENERAL: No weight changes. No swelling. No fatigue. No fever.  CARDIOVASCULAR: No chest pain. No shortness of breath. No leg cramps.   NEUROLOGICAL: No headaches. No vision changes.  BREASTS: No pain. No lumps. No discharge.  ABDOMEN: No pain. No nausea. No vomiting. No diarrhea. No  "constipation.  REPRODUCTIVE: No abnormal bleeding.   VULVA: No pain. No lesions. No itching.  VAGINA: No relaxation. No itching. No odor. No discharge. No lesions.  URINARY: No incontinence. No nocturia. No frequency. No dysuria.    /79 (BP Location: Right arm, Patient Position: Sitting, BP Method: Small (Automatic))   Ht 5' 3" (1.6 m)   Wt 56.5 kg (124 lb 9 oz)   LMP 07/23/2019 (Exact Date)   BMI 22.06 kg/m²   7-23-18 Wt 55.4 kg (122 lb 2.2 oz)    PE:  APPEARANCE: Well nourished, well developed, in no acute distress.  AFFECT: WNL, alert and oriented x 3.  SKIN: No acne or hirsutism.  NECK: Neck symmetric, without masses or thyromegaly.  NODES: No inguinal, cervical, axillary or femoral lymph node enlargement.  CHEST: Good respiratory effort.   ABDOMEN: Soft. No tenderness or masses. No hepatosplenomegaly. No hernias.  BREASTS: Symmetrical, no skin changes, visible lesions, palpable masses or nipple discharge bilaterally.  PELVIC: External female genitalia without lesions.  Female hair distribution. Adequate perineal body, Normal urethral meatus. Vagina moist and well rugated without lesions or discharge.  No significant cystocele or rectocele present. Cervix pink without lesions, discharge or tenderness. Uterus is 4-6 week size, regular, mobile and nontender. Adnexa without masses or tenderness.  EXTREMITIES: No edema    DIAGNOSIS:  1. Well woman exam with routine gynecological exam        PLAN:    ORDERS:  None    COUNSELING:  The patient was counseled today on:  -A.C.S. Pap and pelvic exam guidelines (pap every 3 years);  -to follow up with her PCP for other health maintenance.    FOLLOW-UP withDELILAH Butterfield NP annually.     "

## 2019-10-25 NOTE — TELEPHONE ENCOUNTER
----- Message from Vanda De La Torre sent at 10/25/2019  1:33 PM CDT -----  Contact: ALFRED FOLEY [[id number]]      Can the clinic reply in [facility]: no    Please refill the medication(s) listed below. Please call the patient when the prescription(s) is ready for  at this phone number  [phone] (home)       Medication #1 citalopram (CELEXA) 20 MG tablet       Preferred Pharmacy:   [id number] - [place] - [address] AT [facility] [address]  [address] [address]  Phone: [phone] Fax: [phone]

## 2019-10-28 ENCOUNTER — TELEPHONE (OUTPATIENT)
Dept: OBSTETRICS AND GYNECOLOGY | Facility: CLINIC | Age: 29
End: 2019-10-28

## 2019-10-28 RX ORDER — CITALOPRAM 20 MG/1
20 TABLET, FILM COATED ORAL DAILY
Qty: 30 TABLET | Refills: 2 | Status: SHIPPED | OUTPATIENT
Start: 2019-10-28 | End: 2020-07-27

## 2019-10-28 NOTE — TELEPHONE ENCOUNTER
----- Message from Lucrecia Lr sent at 10/28/2019  3:18 PM CDT -----  Contact: ALFRED FOLEY [[id number]]      MEDICATION  REFILL  REQUEST      Please refill the medication(s) listed below. Please call the patient when the prescription(s) is ready for  at this phone number  [phone] (H)       Medication #1  citalopram (CELEXA) 20 MG tablet        Preferred Pharmacy:   [facility] [address] [place] [address] [address] AT [facility] [address]     [phone] (Phone)  [phone] (Fax)

## 2019-10-29 DIAGNOSIS — F32.A DEPRESSION, UNSPECIFIED DEPRESSION TYPE: ICD-10-CM

## 2019-10-29 RX ORDER — CITALOPRAM 20 MG/1
TABLET, FILM COATED ORAL
Qty: 90 TABLET | Refills: 4 | Status: SHIPPED | OUTPATIENT
Start: 2019-10-29 | End: 2020-11-12 | Stop reason: SDUPTHER

## 2019-12-13 ENCOUNTER — OFFICE VISIT (OUTPATIENT)
Dept: INTERNAL MEDICINE | Facility: CLINIC | Age: 29
End: 2019-12-13
Payer: COMMERCIAL

## 2019-12-13 VITALS
HEART RATE: 70 BPM | HEIGHT: 64 IN | BODY MASS INDEX: 21.04 KG/M2 | SYSTOLIC BLOOD PRESSURE: 110 MMHG | OXYGEN SATURATION: 99 % | WEIGHT: 123.25 LBS | DIASTOLIC BLOOD PRESSURE: 69 MMHG

## 2019-12-13 DIAGNOSIS — M94.0 COSTOCHONDRITIS: Primary | ICD-10-CM

## 2019-12-13 PROCEDURE — 99203 PR OFFICE/OUTPT VISIT, NEW, LEVL III, 30-44 MIN: ICD-10-PCS | Mod: S$GLB,,, | Performed by: INTERNAL MEDICINE

## 2019-12-13 PROCEDURE — 99999 PR PBB SHADOW E&M-EST. PATIENT-LVL III: CPT | Mod: PBBFAC,,, | Performed by: INTERNAL MEDICINE

## 2019-12-13 PROCEDURE — 3008F BODY MASS INDEX DOCD: CPT | Mod: CPTII,S$GLB,, | Performed by: INTERNAL MEDICINE

## 2019-12-13 PROCEDURE — 99203 OFFICE O/P NEW LOW 30 MIN: CPT | Mod: S$GLB,,, | Performed by: INTERNAL MEDICINE

## 2019-12-13 PROCEDURE — 3008F PR BODY MASS INDEX (BMI) DOCUMENTED: ICD-10-PCS | Mod: CPTII,S$GLB,, | Performed by: INTERNAL MEDICINE

## 2019-12-13 PROCEDURE — 99999 PR PBB SHADOW E&M-EST. PATIENT-LVL III: ICD-10-PCS | Mod: PBBFAC,,, | Performed by: INTERNAL MEDICINE

## 2019-12-13 RX ORDER — METHYLPREDNISOLONE 4 MG/1
TABLET ORAL
Qty: 1 PACKAGE | Refills: 0 | Status: SHIPPED | OUTPATIENT
Start: 2019-12-13 | End: 2020-01-03

## 2019-12-13 NOTE — PROGRESS NOTES
Subjective:       Patient ID: Kiley Vyas is a 29 y.o. female.    Chief Complaint: Chest Pain    29 year old lady with mid chest pain worst with deep breath.  Feels like someone is pressing with 1-2 fingers into sternum  No injury.  No cough.  Had clear CXR at [place] where dad works    Review of Systems   Constitutional: Negative for activity change, chills, fatigue and fever.   HENT: Negative for congestion, ear pain, nosebleeds, postnasal drip, sinus pressure and sore throat.    Eyes: Negative.  Negative for visual disturbance.   Respiratory: Negative for cough, chest tightness, shortness of breath and wheezing.    Cardiovascular: Negative for chest pain.   Gastrointestinal: Negative for abdominal pain, diarrhea, nausea and vomiting.   Genitourinary: Negative for difficulty urinating, dysuria, frequency and urgency.   Musculoskeletal: Negative for arthralgias and neck stiffness.   Skin: Negative for rash.   Neurological: Negative for dizziness, weakness and headaches.   Psychiatric/Behavioral: Negative for sleep disturbance. The patient is not nervous/anxious.        Objective:      Physical Exam   Constitutional: She is oriented to person, place, and time. She appears well-developed and well-nourished.  Non-toxic appearance. No distress.   HENT:   Head: Normocephalic and atraumatic.   Right Ear: Tympanic membrane, external ear and ear canal normal.   Left Ear: Tympanic membrane, external ear and ear canal normal.   Eyes: Pupils are equal, round, and reactive to light. EOM are normal. No scleral icterus.   Neck: Normal range of motion. Neck supple. No thyromegaly present.   Cardiovascular: Normal rate, regular rhythm and normal heart sounds.   Pulmonary/Chest: Effort normal and breath sounds normal.       Abdominal: Soft. Bowel sounds are normal. She exhibits no mass. There is no tenderness. There is no rebound.   Musculoskeletal: Normal range of motion.   Lymphadenopathy:     She has no cervical  adenopathy.   Neurological: She is alert and oriented to person, place, and time. She has normal reflexes. She displays normal reflexes. No cranial nerve deficit. She exhibits normal muscle tone. Coordination normal.   Skin: Skin is warm and dry.   Psychiatric: She has a normal mood and affect. Her behavior is normal.       Assessment:       No diagnosis found.    Plan:   There are no diagnoses linked to this encounter.

## 2020-06-08 ENCOUNTER — OFFICE VISIT (OUTPATIENT)
Dept: DERMATOLOGY | Facility: CLINIC | Age: 30
End: 2020-06-08
Payer: COMMERCIAL

## 2020-06-08 DIAGNOSIS — L30.9 ECZEMA, UNSPECIFIED TYPE: ICD-10-CM

## 2020-06-08 DIAGNOSIS — L40.8 SEBOPSORIASIS: Primary | ICD-10-CM

## 2020-06-08 PROCEDURE — 99214 PR OFFICE/OUTPT VISIT, EST, LEVL IV, 30-39 MIN: ICD-10-PCS | Mod: 95,,, | Performed by: PHYSICIAN ASSISTANT

## 2020-06-08 PROCEDURE — 99214 OFFICE O/P EST MOD 30 MIN: CPT | Mod: 95,,, | Performed by: PHYSICIAN ASSISTANT

## 2020-06-08 RX ORDER — FLUOCINOLONE ACETONIDE 0.11 MG/ML
OIL TOPICAL
Qty: 1 BOTTLE | Refills: 3 | Status: SHIPPED | OUTPATIENT
Start: 2020-06-08 | End: 2020-09-30

## 2020-06-08 RX ORDER — KETOCONAZOLE 20 MG/ML
SHAMPOO, SUSPENSION TOPICAL
Qty: 120 ML | Refills: 5 | Status: SHIPPED | OUTPATIENT
Start: 2020-06-08 | End: 2020-09-30

## 2020-06-08 RX ORDER — TRIAMCINOLONE ACETONIDE 1 MG/G
OINTMENT TOPICAL
Qty: 80 G | Refills: 2 | Status: SHIPPED | OUTPATIENT
Start: 2020-06-08 | End: 2020-09-30

## 2020-06-08 NOTE — PROGRESS NOTES
Subjective:       Patient ID:  Kiley Vyas is a 30 y.o. female who presents for sebopsoriasis f/u.    The patient location is: home  The chief complaint leading to consultation is: sebopsoriasis f/u    Visit type: audiovisual    Face to Face time with patient: 8  10 minutes of total time spent on the encounter, which includes face to face time and non-face to face time preparing to see the patient (eg, review of tests), Obtaining and/or reviewing separately obtained history, Documenting clinical information in the electronic or other health record, Independently interpreting results (not separately reported) and communicating results to the patient/family/caregiver, or Care coordination (not separately reported).     Each patient to whom he or she provides medical services by telemedicine is:  (1) informed of the relationship between the physician and patient and the respective role of any other health care provider with respect to management of the patient; and (2) notified that he or she may decline to receive medical services by telemedicine and may withdraw from such care at any time.    Notes:       Psoriasis  - Follow-up  Symptom course: unchanged (Last seen 9/5/18 - GP)  Currently using: betamethasone ointment for ears, clobetasol soln. for scalp. s/p ILK 5mg to scalp w/ some short term relief, feels itching has returned.   Affected locations: scalp, left ear and left arm  Signs / symptoms: itching, burning and spreading  Severity: moderate        Review of Systems   Constitutional: Negative for fever.   Skin: Positive for itching, rash and activity-related sunscreen use. Negative for daily sunscreen use.   Hematologic/Lymphatic: Does not bruise/bleed easily.        Objective:    Physical Exam   Constitutional: She appears well-developed and well-nourished. No distress.   Neurological: She is alert and oriented to person, place, and time. She is not disoriented.   Psychiatric: She has a normal mood and  affect.   Skin:   Areas Examined (abnormalities noted in diagram):   Scalp / Hair Palpated and Inspected  Head / Face Inspection Performed  Neck Inspection Performed  LUE Inspection Performed                  Diagram Legend     Erythematous scaling macule/papule c/w actinic keratosis       Vascular papule c/w angioma      Pigmented verrucoid papule/plaque c/w seborrheic keratosis      Yellow umbilicated papule c/w sebaceous hyperplasia      Irregularly shaped tan macule c/w lentigo     1-2 mm smooth white papules consistent with Milia      Movable subcutaneous cyst with punctum c/w epidermal inclusion cyst      Subcutaneous movable cyst c/w pilar cyst      Firm pink to brown papule c/w dermatofibroma      Pedunculated fleshy papule(s) c/w skin tag(s)      Evenly pigmented macule c/w junctional nevus     Mildly variegated pigmented, slightly irregular-bordered macule c/w mildly atypical nevus      Flesh colored to evenly pigmented papule c/w intradermal nevus       Pink pearly papule/plaque c/w basal cell carcinoma      Erythematous hyperkeratotic cursted plaque c/w SCC      Surgical scar with no sign of skin cancer recurrence      Open and closed comedones      Inflammatory papules and pustules      Verrucoid papule consistent consistent with wart     Erythematous eczematous patches and plaques     Dystrophic onycholytic nail with subungual debris c/w onychomycosis     Umbilicated papule    Erythematous-base heme-crusted tan verrucoid plaque consistent with inflamed seborrheic keratosis     Erythematous Silvery Scaling Plaque c/w Psoriasis     See annotation              Assessment / Plan:      Sebopsoriasis - EUP  -     ketoconazole (NIZORAL) 2 % shampoo; Wash hair with medicated shampoo at least 2x/week - let sit on scalp at least 5 minutes prior to rinsing  Dispense: 120 mL; Refill: 5  -     fluocinolone and shower cap (DERMA-SMOOTHE/FS SCALP OIL) 0.01 % Oil; Apply oil to damp scalp nightly and cover with shower  cap.  Dispense: 1 Bottle; Refill: 3  -     triamcinolone acetonide 0.1% (KENALOG) 0.1 % ointment; AAA left ear and left arm bid x 2 wks then only prn for flares.  Dispense: 80 g; Refill: 2    Recommend clinic f/u appt for possible injections and to better examine scalp.    Eczema, unspecified type - NP  -     triamcinolone acetonide 0.1% (KENALOG) 0.1 % ointment; AAA left ear and left arm bid x 2 wks then only prn for flares.  Dispense: 80 g; Refill: 2         Follow up in about 2 months (around 8/8/2020), or if symptoms worsen or fail to improve.

## 2020-07-27 ENCOUNTER — OFFICE VISIT (OUTPATIENT)
Dept: OBSTETRICS AND GYNECOLOGY | Facility: CLINIC | Age: 30
End: 2020-07-27
Payer: COMMERCIAL

## 2020-07-27 VITALS
HEIGHT: 64 IN | BODY MASS INDEX: 21.76 KG/M2 | SYSTOLIC BLOOD PRESSURE: 102 MMHG | DIASTOLIC BLOOD PRESSURE: 60 MMHG | WEIGHT: 127.44 LBS

## 2020-07-27 DIAGNOSIS — L40.8 SEBOPSORIASIS: ICD-10-CM

## 2020-07-27 DIAGNOSIS — Z01.419 WELL WOMAN EXAM WITH ROUTINE GYNECOLOGICAL EXAM: Primary | ICD-10-CM

## 2020-07-27 PROCEDURE — 99395 PREV VISIT EST AGE 18-39: CPT | Mod: S$GLB,,, | Performed by: OBSTETRICS & GYNECOLOGY

## 2020-07-27 PROCEDURE — 99999 PR PBB SHADOW E&M-EST. PATIENT-LVL III: ICD-10-PCS | Mod: PBBFAC,,, | Performed by: OBSTETRICS & GYNECOLOGY

## 2020-07-27 PROCEDURE — 99395 PR PREVENTIVE VISIT,EST,18-39: ICD-10-PCS | Mod: S$GLB,,, | Performed by: OBSTETRICS & GYNECOLOGY

## 2020-07-27 PROCEDURE — 87624 HPV HI-RISK TYP POOLED RSLT: CPT

## 2020-07-27 PROCEDURE — 88175 CYTOPATH C/V AUTO FLUID REDO: CPT

## 2020-07-27 PROCEDURE — 99999 PR PBB SHADOW E&M-EST. PATIENT-LVL III: CPT | Mod: PBBFAC,,, | Performed by: OBSTETRICS & GYNECOLOGY

## 2020-07-27 RX ORDER — CLOBETASOL PROPIONATE 0.46 MG/ML
SOLUTION TOPICAL
Qty: 25 ML | Refills: 3 | Status: SHIPPED | OUTPATIENT
Start: 2020-07-27 | End: 2020-09-30

## 2020-07-27 NOTE — PROGRESS NOTES
SUBJECTIVE:     Chief Complaint: Annual Exam       History of Present Illness:  Annual Exam  Patient presents for annual exam.   She c/o nothing today.  LMP: 2020  She denies any vd, vb, dyspareunia, dysuria, depression, anxiety.  Last pap was in 2017 and was neg.  Birth Control: none, declines  Declines STD testing. Not currently sexually active.    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    Seborrheic dermatitis     Strabismus      Past Surgical History:   Procedure Laterality Date    STRABISMUS SURGERY  1994    Denervation and extirpation of IO ou    STRABISMUS SURGERY  2010    Recession LR OD 6mm, Resection MR OD 5mm     OB History        0    Para   0    Term   0       0    AB   0    Living   0       SAB   0    TAB   0    Ectopic   0    Multiple   0    Live Births   0               Family History   Problem Relation Age of Onset    Colon cancer Mother 51    Diabetes Paternal Grandfather     Cataracts Maternal Grandmother     Breast cancer Neg Hx     Ovarian cancer Neg Hx      Social History     Tobacco Use    Smoking status: Never Smoker    Smokeless tobacco: Never Used   Substance Use Topics    Alcohol use: Yes    Drug use: No       Current Outpatient Medications   Medication Sig    betamethasone dipropionate (DIPROLENE) 0.05 % cream AAA left ear and left upper inner arm bid    citalopram (CELEXA) 20 MG tablet TAKE 1 TABLET(20 MG) BY MOUTH EVERY DAY    clobetasoL (TEMOVATE) 0.05 % external solution Apply to scalp qd-bid as needed for scaling or itching.    fluocinolone and shower cap (DERMA-SMOOTHE/FS SCALP OIL) 0.01 % Oil Apply oil to damp scalp nightly and cover with shower  cap.    ketoconazole (NIZORAL) 2 % shampoo Wash hair with medicated shampoo at least 2x/week - let sit on scalp at least 5 minutes prior to rinsing    triamcinolone acetonide 0.1% (KENALOG) 0.1 % ointment AAA left ear and left arm bid x 2 wks then only prn for flares.    tazarotene (TAZORAC) 0.05 % Crea cream Apply topically every evening.     No current facility-administered medications for this visit.        Review of Systems:  GENERAL: No fever, chills, fatigability or weight loss.  CARDIOVASCULAR: No chest pain. No palpitations.  RESPIRATORY: No SOB, no wheezing.  BREAST: Denies pain. No lumps. No discharge.  VULVAR: No pain, no lesions and no itching.  VAGINAL: No relaxation, no itching, no discharge, no abnormal bleeding and no lesions.  ABDOMEN: No abdominal pain. Denies nausea. Denies vomiting. No diarrhea. No constipation  URINARY: No incontinence, no nocturia, no frequency and no dysuria.  NEUROLOGICAL: No headaches. No vision changes.       OBJECTIVE:     Vitals:    07/27/20 1437   BP: 102/60       Physical Exam:  Gen: NAD, well developed, well-nourished  HEENT: Normocephalic, atraumatic  Eyes: EOM nl, conjuntivae normal  Neck: ROM normal, no thyromegaly  Respiratory: Effort normal   Abd: soft, nontender, no masses palpated  Breast: Normal bilaterally, no masses, lesions or tenderness. No nipple discharge on expression, no lymphadenopathy bilaterally.  SSE:  Vulva: no lesions or rashes  Cervix: No lesions noted, nonfriable, no vaginal discharge or vaginal bleeding noted  BME:   Cervix: No CMT  Adnexa: nl bilaterally, no masses or fullness palpated  Uterus: normal, nonenlarged  Musculoskeletal: normal ROM  Neuro: alert, AAOx3  Skin: warm and dry  Psych: mood/affect nl, behavior normal, judgement normal, thought content normal        ASSESSMENT:       ICD-10-CM ICD-9-CM    1. Well woman exam with routine gynecological exam  Z01.419 V72.31 Liquid-Based Pap Smear, Screening      HPV High Risk Genotypes,  PCR          Plan:      Kiley was seen today for annual exam.    Diagnoses and all orders for this visit:    Well woman exam with routine gynecological exam  -     Liquid-Based Pap Smear, Screening  -     HPV High Risk Genotypes, PCR        Orders Placed This Encounter   Procedures    HPV High Risk Genotypes, PCR       Follow up in one year for annual, or prn.    Julie R Jeansonne

## 2020-08-03 LAB
HPV HR 12 DNA SPEC QL NAA+PROBE: NEGATIVE
HPV16 AG SPEC QL: NEGATIVE
HPV18 DNA SPEC QL NAA+PROBE: NEGATIVE

## 2020-08-09 LAB
FINAL PATHOLOGIC DIAGNOSIS: NORMAL
Lab: NORMAL

## 2020-09-16 ENCOUNTER — PATIENT MESSAGE (OUTPATIENT)
Dept: OBSTETRICS AND GYNECOLOGY | Facility: CLINIC | Age: 30
End: 2020-09-16

## 2020-09-16 RX ORDER — NAPROXEN SODIUM 550 MG/1
550 TABLET ORAL 2 TIMES DAILY WITH MEALS
Qty: 30 TABLET | Refills: 1 | Status: SHIPPED | OUTPATIENT
Start: 2020-09-16 | End: 2020-09-30

## 2020-09-17 ENCOUNTER — TELEPHONE (OUTPATIENT)
Dept: OBSTETRICS AND GYNECOLOGY | Facility: CLINIC | Age: 30
End: 2020-09-17

## 2020-09-17 ENCOUNTER — PATIENT MESSAGE (OUTPATIENT)
Dept: OBSTETRICS AND GYNECOLOGY | Facility: CLINIC | Age: 30
End: 2020-09-17

## 2020-09-21 ENCOUNTER — OFFICE VISIT (OUTPATIENT)
Dept: OBSTETRICS AND GYNECOLOGY | Facility: CLINIC | Age: 30
End: 2020-09-21
Payer: COMMERCIAL

## 2020-09-21 VITALS
HEIGHT: 64 IN | SYSTOLIC BLOOD PRESSURE: 100 MMHG | BODY MASS INDEX: 18.82 KG/M2 | WEIGHT: 110.25 LBS | DIASTOLIC BLOOD PRESSURE: 60 MMHG

## 2020-09-21 DIAGNOSIS — R31.9 URINARY TRACT INFECTION WITH HEMATURIA, SITE UNSPECIFIED: ICD-10-CM

## 2020-09-21 DIAGNOSIS — R10.2 PELVIC PAIN IN FEMALE: Primary | ICD-10-CM

## 2020-09-21 DIAGNOSIS — N39.0 URINARY TRACT INFECTION WITH HEMATURIA, SITE UNSPECIFIED: ICD-10-CM

## 2020-09-21 LAB
B-HCG UR QL: NEGATIVE
BILIRUB SERPL-MCNC: NORMAL MG/DL
BLOOD URINE, POC: ABNORMAL
CLARITY, POC UA: ABNORMAL
COLOR, POC UA: YELLOW
CTP QC/QA: YES
GLUCOSE UR QL STRIP: NORMAL
KETONES UR QL STRIP: NORMAL
LEUKOCYTE ESTERASE URINE, POC: ABNORMAL
NITRITE, POC UA: ABNORMAL
PH, POC UA: 7
PROTEIN, POC: NORMAL
SPECIFIC GRAVITY, POC UA: 1
UROBILINOGEN, POC UA: NORMAL

## 2020-09-21 PROCEDURE — 99214 OFFICE O/P EST MOD 30 MIN: CPT | Mod: 25,S$GLB,, | Performed by: ADVANCED PRACTICE MIDWIFE

## 2020-09-21 PROCEDURE — 3008F PR BODY MASS INDEX (BMI) DOCUMENTED: ICD-10-PCS | Mod: CPTII,S$GLB,, | Performed by: ADVANCED PRACTICE MIDWIFE

## 2020-09-21 PROCEDURE — 87510 GARDNER VAG DNA DIR PROBE: CPT

## 2020-09-21 PROCEDURE — 87086 URINE CULTURE/COLONY COUNT: CPT

## 2020-09-21 PROCEDURE — 99214 PR OFFICE/OUTPT VISIT, EST, LEVL IV, 30-39 MIN: ICD-10-PCS | Mod: 25,S$GLB,, | Performed by: ADVANCED PRACTICE MIDWIFE

## 2020-09-21 PROCEDURE — 87480 CANDIDA DNA DIR PROBE: CPT

## 2020-09-21 PROCEDURE — 99999 PR PBB SHADOW E&M-EST. PATIENT-LVL III: CPT | Mod: PBBFAC,,, | Performed by: ADVANCED PRACTICE MIDWIFE

## 2020-09-21 PROCEDURE — 99999 PR PBB SHADOW E&M-EST. PATIENT-LVL III: ICD-10-PCS | Mod: PBBFAC,,, | Performed by: ADVANCED PRACTICE MIDWIFE

## 2020-09-21 PROCEDURE — 3008F BODY MASS INDEX DOCD: CPT | Mod: CPTII,S$GLB,, | Performed by: ADVANCED PRACTICE MIDWIFE

## 2020-09-21 PROCEDURE — 81002 POCT URINE DIPSTICK WITHOUT MICROSCOPE: ICD-10-PCS | Mod: S$GLB,,, | Performed by: ADVANCED PRACTICE MIDWIFE

## 2020-09-21 PROCEDURE — 81002 URINALYSIS NONAUTO W/O SCOPE: CPT | Mod: S$GLB,,, | Performed by: ADVANCED PRACTICE MIDWIFE

## 2020-09-21 RX ORDER — NITROFURANTOIN 25; 75 MG/1; MG/1
100 CAPSULE ORAL 2 TIMES DAILY
Qty: 10 CAPSULE | Refills: 0 | Status: SHIPPED | OUTPATIENT
Start: 2020-09-21 | End: 2020-09-26

## 2020-09-21 NOTE — PROGRESS NOTES
Kiley Vyas is a 30 y.o. female  p[facility] with complaint of pain in her hips over 1 week. Pt reports pain happens intermittently , happens whether sitting or standing. When pain is sharp it is a pain scale of 8, sometimes is somewhat dull. Pain is not associated waith her menstrual cycle/ Pt usually resolves within about 10 minutes. Pt reports has taken anaprox, Aleve and Tylenol with no relief. Pt reports uncomfortable sleeping. Report more on the L side than the R side. Pt reports no s/s UTI. Pt denies any abdominal or vaginal pain. Pt reports has never been sexually active.      ROS:  GENERAL: No fever, chills, fatigability or weight loss. Reports hip pain  VULVAR: No pain, no lesions and no itching.  VAGINAL: No relaxation, no abnormal bleeding and no lesions.  ABDOMEN: No abdominal pain. Denies nausea. Denies vomiting. No diarrhea. No constipation  BREAST: Denies pain. No lumps. No discharge.  URINARY: No incontinence, no nocturia, no frequency and no dysuria.  CARDIOVASCULAR: No chest pain. No shortness of breath. No leg cramps.  NEUROLOGICAL: No headaches. No vision changes.      Review of patient's allergies indicates:  No Known Allergies    Current Outpatient Medications:     betamethasone dipropionate (DIPROLENE) 0.05 % cream, AAA left ear and left upper inner arm bid, Disp: 60 g, Rfl: 3    citalopram (CELEXA) 20 MG tablet, TAKE 1 TABLET(20 MG) BY MOUTH EVERY DAY, Disp: 90 tablet, Rfl: 4    clobetasoL (TEMOVATE) 0.05 % external solution, Apply to scalp qd-bid as needed for scaling or itching., Disp: 25 mL, Rfl: 3    fluocinolone and shower cap (DERMA-SMOOTHE/FS SCALP OIL) 0.01 % Oil, Apply oil to damp scalp nightly and cover with shower cap., Disp: 1 Bottle, Rfl: 3    ketoconazole (NIZORAL) 2 % shampoo, Wash hair with medicated shampoo at least 2x/week - let sit on scalp at least 5 minutes prior to rinsing, Disp: 120 mL, Rfl: 5    naproxen sodium (ANAPROX) 550 MG  tablet, Take 1 tablet (550 mg total) by mouth 2 (two) times daily with meals., Disp: 30 tablet, Rfl: 1    tazarotene (TAZORAC) 0.05 % Crea cream, Apply topically every evening., Disp: 30 g, Rfl: 3    triamcinolone acetonide 0.1% (KENALOG) 0.1 % ointment, AAA left ear and left arm bid x 2 wks then only prn for flares., Disp: 80 g, Rfl: 2    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    Seborrheic dermatitis     Strabismus      Past Surgical History:   Procedure Laterality Date    STRABISMUS SURGERY  1994    Denervation and extirpation of IO ou    STRABISMUS SURGERY  2010    Recession LR OD 6mm, Resection MR OD 5mm     Social History     Tobacco Use    Smoking status: Never Smoker    Smokeless tobacco: Never Used   Substance Use Topics    Alcohol use: Yes     Comment: socia;    Drug use: No     OB History    Para Term  AB Living   0 0 0 0 0 0   SAB TAB Ectopic Multiple Live Births   0 0 0 0 0       /60   Wt 50 kg (110 lb 3.7 oz)   LMP 2020   BMI 18.92 kg/m²     PHYSICAL EXAM:  GENERAL: Calm and appropriate affect, alert, oriented x4  SKIN: Color appropriate for race, warm and dry, clean and intact with no rashes.  RESP: Even, unlabored breathing  ABDOMEN: Soft, nontender, no masses. Pt reports no pain with exam. Points to top of hip bones for location of pain.   :   Normal external female genitalia without lesions. Normal hair distribution. Adequate perineal body, normal urethral meatus.  Vagina pink and well rugated, no lesions, vaginal discharge - currently on cycle  No significant cystocele or rectocele.  Cervix pink without discharge or lesions, no cervical motion tenderness.  Uterus 4-6 weeks size, regular, mobile and nontender.Unable to elicit pain on exam  Adnexa: normal adnexa in size, nontender and no masses. Unable to elicit pain on exam        ASSESSMENT / PLAN:    ICD-10-CM ICD-9-CM    1. Pelvic pain in female  R10.2  625.9      Pelvic pain in female      Discussed POCT urinalysis with positive nitrites and positive blood.Discussed pending urine culture and rx for macrobid sent in today.  Pelvic US today.          FOLLOW UP:   Pending lab results, PRN lack of improvement.

## 2020-09-22 ENCOUNTER — PATIENT MESSAGE (OUTPATIENT)
Dept: OBSTETRICS AND GYNECOLOGY | Facility: CLINIC | Age: 30
End: 2020-09-22

## 2020-09-22 LAB
CANDIDA RRNA VAG QL PROBE: NEGATIVE
G VAGINALIS RRNA GENITAL QL PROBE: NEGATIVE
T VAGINALIS RRNA GENITAL QL PROBE: NEGATIVE

## 2020-09-23 ENCOUNTER — PATIENT MESSAGE (OUTPATIENT)
Dept: INTERNAL MEDICINE | Facility: CLINIC | Age: 30
End: 2020-09-23

## 2020-09-23 ENCOUNTER — PATIENT MESSAGE (OUTPATIENT)
Dept: OBSTETRICS AND GYNECOLOGY | Facility: CLINIC | Age: 30
End: 2020-09-23

## 2020-09-23 ENCOUNTER — TELEPHONE (OUTPATIENT)
Dept: INTERNAL MEDICINE | Facility: CLINIC | Age: 30
End: 2020-09-23

## 2020-09-23 LAB
BACTERIA UR CULT: NORMAL
BACTERIA UR CULT: NORMAL

## 2020-09-23 NOTE — TELEPHONE ENCOUNTER
----- Message from Aminah Cabrera sent at 9/23/2020 10:25 AM CDT -----  Contact: [phone]  Appointment Request From: Kiley Vyas    With Provider: Gaby Hernandez MD [[facility]]    Preferred Date Range: 9/28/2020 - 10/3/2020    Preferred Times: Monday Afternoon, Tuesday Morning, Tuesday Afternoon, Thursday Afternoon, Friday Morning, Friday Afternoon    Reason for visit: Pain in hip    Comments:  Hip pain

## 2020-09-30 ENCOUNTER — OFFICE VISIT (OUTPATIENT)
Dept: PRIMARY CARE CLINIC | Facility: CLINIC | Age: 30
End: 2020-09-30
Payer: COMMERCIAL

## 2020-09-30 ENCOUNTER — TELEPHONE (OUTPATIENT)
Dept: INTERNAL MEDICINE | Facility: CLINIC | Age: 30
End: 2020-09-30

## 2020-09-30 VITALS
WEIGHT: 127.44 LBS | HEART RATE: 75 BPM | TEMPERATURE: 98 F | OXYGEN SATURATION: 97 % | SYSTOLIC BLOOD PRESSURE: 104 MMHG | DIASTOLIC BLOOD PRESSURE: 70 MMHG | HEIGHT: 60 IN | BODY MASS INDEX: 25.02 KG/M2 | RESPIRATION RATE: 16 BRPM

## 2020-09-30 DIAGNOSIS — R10.2 PAIN IN FEMALE PELVIS: Primary | ICD-10-CM

## 2020-09-30 PROCEDURE — 99214 OFFICE O/P EST MOD 30 MIN: CPT | Mod: S$GLB,,, | Performed by: FAMILY MEDICINE

## 2020-09-30 PROCEDURE — 99214 PR OFFICE/OUTPT VISIT, EST, LEVL IV, 30-39 MIN: ICD-10-PCS | Mod: S$GLB,,, | Performed by: FAMILY MEDICINE

## 2020-09-30 PROCEDURE — 3008F PR BODY MASS INDEX (BMI) DOCUMENTED: ICD-10-PCS | Mod: CPTII,S$GLB,, | Performed by: FAMILY MEDICINE

## 2020-09-30 PROCEDURE — 3008F BODY MASS INDEX DOCD: CPT | Mod: CPTII,S$GLB,, | Performed by: FAMILY MEDICINE

## 2020-09-30 PROCEDURE — 99999 PR PBB SHADOW E&M-EST. PATIENT-LVL IV: CPT | Mod: PBBFAC,,, | Performed by: FAMILY MEDICINE

## 2020-09-30 PROCEDURE — 99999 PR PBB SHADOW E&M-EST. PATIENT-LVL IV: ICD-10-PCS | Mod: PBBFAC,,, | Performed by: FAMILY MEDICINE

## 2020-09-30 RX ORDER — IBUPROFEN 800 MG/1
800 TABLET ORAL
Qty: 42 TABLET | Refills: 0 | Status: SHIPPED | OUTPATIENT
Start: 2020-09-30 | End: 2020-10-14

## 2020-09-30 NOTE — TELEPHONE ENCOUNTER
----- Message from Aminah Cabrera sent at 9/30/2020 11:19 AM CDT -----  Contact: [phone]  Appointment Request From: Kiley Vyas    With Provider: Gaby Hernandez MD [[facility]]    Preferred Date Range: 10/5/2020 - 10/9/2020    Preferred Times: Monday Afternoon, Tuesday Morning, Tuesday Afternoon, Wednesday Morning, Thursday Morning, Friday Morning, Friday Afternoon    Reason for visit: Pelvic pain    Comments:  Pelvic pain

## 2020-09-30 NOTE — PROGRESS NOTES
Subjective:       Patient ID: Kiley Vyas is a 30 y.o. female.    Chief Complaint: Hip Pain (x2 weeks )    Sharp bilateral hip pain for the past 2 weeks, initially thought might have been premenstrual cramps. Pain constant, waxes and wanes in intensity. Contacted GYN last week, got prescription for naproxen, which helped slightly. Saw GYN NP, got Macrobid for possible UTI, but no relief. Pain worse on right while seated, on left when up and moving around. No known injury. Has tried ice w/o relief. No FHx of rheumatologic disease. Father did a CT of hips at [facility], reportedly normal.    Review of Systems   Constitutional: Negative for chills, diaphoresis and fever.   Respiratory: Negative for shortness of breath.    Cardiovascular: Negative for chest pain.   Genitourinary: Negative for dysuria, hematuria and menstrual problem.   Musculoskeletal: Positive for arthralgias.   Skin: Negative for color change, rash and wound.   Neurological: Negative for weakness and numbness.   Hematological: Does not bruise/bleed easily.   Psychiatric/Behavioral: Negative for agitation and confusion.       Objective:      Vitals:    09/30/20 1000   BP: 104/70   BP Location: Left arm   Patient Position: Sitting   BP Method: Medium (Manual)   Pulse: 75   Resp: 16   Temp: 98.2 °F (36.8 °C)   TempSrc: Oral   SpO2: 97%   Weight: 57.8 kg (127 lb 6.8 oz)   Height: 5' (1.524 m)   Urine culture  Order: [id number]  Status:  Final result   Visible to patient:  Yes (Patient Portal)   Next appt:  None   Dx:  Urinary tract infection with hematuri...  Specimen Information: Urine, Clean Catch        Component 9d ago   Urine Culture, Routine Multiple organisms isolated. None in predominance.  Repeat if    Urine Culture, Routine clinically necessary.    Resulting Agency [facility]         Specimen Collected: 09/21/20 14:37 Last Resulted: 09/23/20 08:01             Physical Exam  Vitals signs and nursing note reviewed.   Constitutional:       Appearance: She is  well-developed.   HENT:      Head: Normocephalic and atraumatic.   Eyes:      Pupils: Pupils are equal, round, and reactive to light.   Neck:      Musculoskeletal: Neck supple.      Vascular: No carotid bruit or JVD.   Cardiovascular:      Rate and Rhythm: Normal rate and regular rhythm.      Pulses:           Radial pulses are 2+ on the right side and 2+ on the left side.      Heart sounds: Normal heart sounds.   Pulmonary:      Effort: Pulmonary effort is normal.      Breath sounds: Normal breath sounds.   Abdominal:      General: Bowel sounds are normal. There is no distension.      Palpations: Abdomen is soft.      Tenderness: There is no abdominal tenderness.   Musculoskeletal:        Legs:    Skin:     General: Skin is warm and dry.   Neurological:      Mental Status: She is alert and oriented to person, place, and time.   Psychiatric:         Behavior: Behavior normal.         Lab Results   Component Value Date    TSH 1.5 12/29/2005      Assessment:       1. Pain in female pelvis        Plan:       Pain in female pelvis  -     ibuprofen (ADVIL,MOTRIN) 800 MG tablet; Take 1 tablet (800 mg total) by mouth 3 (three) times daily with meals. for 14 days  Dispense: 42 tablet; Refill: 0  Unclear etiology. Apply ice TID, 2-week course of scheduled NSAIDs. If no improvement, consider additional imaging    Medication List with Changes/Refills   New Medications    IBUPROFEN (ADVIL,MOTRIN) 800 MG TABLET    Take 1 tablet (800 mg total) by mouth 3 (three) times daily with meals. for 14 days   Current Medications    CITALOPRAM (CELEXA) 20 MG TABLET    TAKE 1 TABLET(20 MG) BY MOUTH EVERY DAY   Discontinued Medications    BETAMETHASONE DIPROPIONATE (DIPROLENE) 0.05 % CREAM    AAA left ear and left upper inner arm bid    CLOBETASOL (TEMOVATE) 0.05 % EXTERNAL SOLUTION    Apply to scalp qd-bid as needed for scaling or itching.    FLUOCINOLONE AND SHOWER CAP (DERMA-SMOOTHE/FS SCALP OIL) 0.01 % OIL    Apply oil to damp scalp  nightly and cover with shower cap.    KETOCONAZOLE (NIZORAL) 2 % SHAMPOO    Wash hair with medicated shampoo at least 2x/week - let sit on scalp at least 5 minutes prior to rinsing    NAPROXEN SODIUM (ANAPROX) 550 MG TABLET    Take 1 tablet (550 mg total) by mouth 2 (two) times daily with meals.    TAZAROTENE (TAZORAC) 0.05 % CREA CREAM    Apply topically every evening.    TRIAMCINOLONE ACETONIDE 0.1% (KENALOG) 0.1 % OINTMENT    AAA left ear and left arm bid x 2 wks then only prn for flares.

## 2020-10-03 ENCOUNTER — PATIENT MESSAGE (OUTPATIENT)
Dept: INTERNAL MEDICINE | Facility: CLINIC | Age: 30
End: 2020-10-03

## 2020-10-05 ENCOUNTER — TELEPHONE (OUTPATIENT)
Dept: INTERNAL MEDICINE | Facility: CLINIC | Age: 30
End: 2020-10-05

## 2020-10-05 NOTE — TELEPHONE ENCOUNTER
Jeannette can you help me schedule this patient at 4:40 on 10/8 for follow up concerns with Dr. Hernandez?

## 2020-10-05 NOTE — TELEPHONE ENCOUNTER
----- Message from Beth Palacios sent at 10/5/2020  9:16 AM CDT -----  Regarding: self   Patient is returning a phone call.  Who left a message for the patient: Kat Calderon MA  Does patient know what this is regarding:  Patient is available tomorrow all day for an appointment and Wednesday morning and Thursday morning and Friday all day    Comments:

## 2020-10-08 ENCOUNTER — OFFICE VISIT (OUTPATIENT)
Dept: INTERNAL MEDICINE | Facility: CLINIC | Age: 30
End: 2020-10-08
Payer: COMMERCIAL

## 2020-10-08 ENCOUNTER — LAB VISIT (OUTPATIENT)
Dept: LAB | Facility: HOSPITAL | Age: 30
End: 2020-10-08
Attending: INTERNAL MEDICINE
Payer: COMMERCIAL

## 2020-10-08 VITALS
BODY MASS INDEX: 24.85 KG/M2 | SYSTOLIC BLOOD PRESSURE: 122 MMHG | DIASTOLIC BLOOD PRESSURE: 78 MMHG | HEIGHT: 60 IN | HEART RATE: 77 BPM | WEIGHT: 126.56 LBS | OXYGEN SATURATION: 98 %

## 2020-10-08 DIAGNOSIS — M76.899 HIP FLEXOR TENDINITIS, UNSPECIFIED LATERALITY: Primary | ICD-10-CM

## 2020-10-08 DIAGNOSIS — M76.899 HIP FLEXOR TENDINITIS, UNSPECIFIED LATERALITY: ICD-10-CM

## 2020-10-08 LAB
BASOPHILS # BLD AUTO: 0.07 K/UL (ref 0–0.2)
BASOPHILS NFR BLD: 0.5 % (ref 0–1.9)
DIFFERENTIAL METHOD: ABNORMAL
EOSINOPHIL # BLD AUTO: 0.1 K/UL (ref 0–0.5)
EOSINOPHIL NFR BLD: 0.8 % (ref 0–8)
ERYTHROCYTE [DISTWIDTH] IN BLOOD BY AUTOMATED COUNT: 12.8 % (ref 11.5–14.5)
ERYTHROCYTE [SEDIMENTATION RATE] IN BLOOD BY WESTERGREN METHOD: 3 MM/HR (ref 0–36)
HCT VFR BLD AUTO: 44 % (ref 37–48.5)
HGB BLD-MCNC: 14.4 G/DL (ref 12–16)
IMM GRANULOCYTES # BLD AUTO: 0.07 K/UL (ref 0–0.04)
IMM GRANULOCYTES NFR BLD AUTO: 0.5 % (ref 0–0.5)
LYMPHOCYTES # BLD AUTO: 3.7 K/UL (ref 1–4.8)
LYMPHOCYTES NFR BLD: 27.8 % (ref 18–48)
MCH RBC QN AUTO: 28.9 PG (ref 27–31)
MCHC RBC AUTO-ENTMCNC: 32.7 G/DL (ref 32–36)
MCV RBC AUTO: 88 FL (ref 82–98)
MONOCYTES # BLD AUTO: 0.7 K/UL (ref 0.3–1)
MONOCYTES NFR BLD: 5.5 % (ref 4–15)
NEUTROPHILS # BLD AUTO: 8.6 K/UL (ref 1.8–7.7)
NEUTROPHILS NFR BLD: 64.9 % (ref 38–73)
NRBC BLD-RTO: 0 /100 WBC
PLATELET # BLD AUTO: 386 K/UL (ref 150–350)
PMV BLD AUTO: 10.2 FL (ref 9.2–12.9)
RBC # BLD AUTO: 4.99 M/UL (ref 4–5.4)
WBC # BLD AUTO: 13.23 K/UL (ref 3.9–12.7)

## 2020-10-08 PROCEDURE — 96372 PR INJECTION,THERAP/PROPH/DIAG2ST, IM OR SUBCUT: ICD-10-PCS | Mod: S$GLB,,, | Performed by: INTERNAL MEDICINE

## 2020-10-08 PROCEDURE — 96372 THER/PROPH/DIAG INJ SC/IM: CPT | Mod: S$GLB,,, | Performed by: INTERNAL MEDICINE

## 2020-10-08 PROCEDURE — 86039 ANTINUCLEAR ANTIBODIES (ANA): CPT

## 2020-10-08 PROCEDURE — 85652 RBC SED RATE AUTOMATED: CPT

## 2020-10-08 PROCEDURE — 86140 C-REACTIVE PROTEIN: CPT

## 2020-10-08 PROCEDURE — 3008F PR BODY MASS INDEX (BMI) DOCUMENTED: ICD-10-PCS | Mod: CPTII,S$GLB,, | Performed by: INTERNAL MEDICINE

## 2020-10-08 PROCEDURE — 85025 COMPLETE CBC W/AUTO DIFF WBC: CPT

## 2020-10-08 PROCEDURE — 36415 COLL VENOUS BLD VENIPUNCTURE: CPT

## 2020-10-08 PROCEDURE — 3008F BODY MASS INDEX DOCD: CPT | Mod: CPTII,S$GLB,, | Performed by: INTERNAL MEDICINE

## 2020-10-08 PROCEDURE — 99999 PR PBB SHADOW E&M-EST. PATIENT-LVL IV: CPT | Mod: PBBFAC,,, | Performed by: INTERNAL MEDICINE

## 2020-10-08 PROCEDURE — 99999 PR PBB SHADOW E&M-EST. PATIENT-LVL IV: ICD-10-PCS | Mod: PBBFAC,,, | Performed by: INTERNAL MEDICINE

## 2020-10-08 PROCEDURE — 86038 ANTINUCLEAR ANTIBODIES: CPT

## 2020-10-08 PROCEDURE — 99213 OFFICE O/P EST LOW 20 MIN: CPT | Mod: 25,S$GLB,, | Performed by: INTERNAL MEDICINE

## 2020-10-08 PROCEDURE — 86235 NUCLEAR ANTIGEN ANTIBODY: CPT | Mod: 59

## 2020-10-08 PROCEDURE — 99213 PR OFFICE/OUTPT VISIT, EST, LEVL III, 20-29 MIN: ICD-10-PCS | Mod: 25,S$GLB,, | Performed by: INTERNAL MEDICINE

## 2020-10-08 RX ORDER — MELOXICAM 15 MG/1
15 TABLET ORAL DAILY
Qty: 30 TABLET | Refills: 1 | Status: SHIPPED | OUTPATIENT
Start: 2020-10-08 | End: 2020-10-30 | Stop reason: SDUPTHER

## 2020-10-08 RX ORDER — HYDROCODONE BITARTRATE AND ACETAMINOPHEN 5; 325 MG/1; MG/1
1 TABLET ORAL EVERY 6 HOURS PRN
Qty: 20 TABLET | Refills: 0 | Status: SHIPPED | OUTPATIENT
Start: 2020-10-08 | End: 2021-02-02

## 2020-10-08 RX ORDER — KETOROLAC TROMETHAMINE 30 MG/ML
60 INJECTION, SOLUTION INTRAMUSCULAR; INTRAVENOUS
Status: COMPLETED | OUTPATIENT
Start: 2020-10-08 | End: 2020-10-08

## 2020-10-08 RX ADMIN — KETOROLAC TROMETHAMINE 60 MG: 30 INJECTION, SOLUTION INTRAMUSCULAR; INTRAVENOUS at 05:10

## 2020-10-08 NOTE — PROGRESS NOTES
Subjective:       Patient ID: Kiley Vyas is a 30 y.o. female.    Chief Complaint: Hip Pain    30 year old lady complains of sharp pain over both anterior superior iliac spines, not associated with injury or over use.  Has history of psoriasis and costochondritis.  Had recent bout of costochondritis after iliac crest pains started.  Got medrol dose opack which relieved the costochondritis  But only minimally lessened the hip pain.    Review of Systems   Constitutional: Negative for activity change, chills, fatigue and fever.   HENT: Negative for congestion, ear pain, nosebleeds, postnasal drip, sinus pressure and sore throat.    Eyes: Negative.  Negative for visual disturbance.   Respiratory: Negative for cough, chest tightness, shortness of breath and wheezing.    Cardiovascular: Negative for chest pain.   Gastrointestinal: Negative for abdominal pain, diarrhea, nausea and vomiting.   Genitourinary: Negative for difficulty urinating, dysuria, frequency and urgency.   Musculoskeletal: Negative for arthralgias and neck stiffness.   Skin: Negative for rash.   Neurological: Negative for dizziness, weakness and headaches.   Psychiatric/Behavioral: Negative for sleep disturbance. The patient is not nervous/anxious.        Objective:      Physical Exam  Musculoskeletal:        Legs:          Assessment:       1. Hip flexor tendinitis, unspecified laterality        Plan:   Kiley was seen today for hip pain.    Diagnoses and all orders for this visit:    Hip flexor tendinitis, unspecified laterality  -     CBC auto differential; Future  -     C-reactive protein; Future  -     CARMEN Screen w/Reflex; Future  -     HYDROcodone-acetaminophen (NORCO) 5-325 mg per tablet; Take 1 tablet by mouth every 6 (six) hours as needed for Pain.  -     Ambulatory referral to Orthopedics; Future  -     Sedimentation rate; Future    Other orders  -     meloxicam (MOBIC) 15 MG tablet; Take 1 tablet (15 mg total) by mouth once daily.  -      ketorolac injection 60 mg

## 2020-10-08 NOTE — PROGRESS NOTES
Two pt identifier name and . Adminster in left dorsal gluteal , tolerated well. Advised to wait 15mins after.

## 2020-10-09 ENCOUNTER — TELEPHONE (OUTPATIENT)
Dept: INTERNAL MEDICINE | Facility: CLINIC | Age: 30
End: 2020-10-09

## 2020-10-09 DIAGNOSIS — M77.9 TENDONITIS: Primary | ICD-10-CM

## 2020-10-09 DIAGNOSIS — R76.8 POSITIVE ANA (ANTINUCLEAR ANTIBODY): ICD-10-CM

## 2020-10-09 LAB
ANA PATTERN 1: NORMAL
ANA SER QL IF: POSITIVE
ANA TITR SER IF: NORMAL {TITER}
CRP SERPL-MCNC: 0.3 MG/L (ref 0–8.2)

## 2020-10-09 NOTE — TELEPHONE ENCOUNTER
----- Message from Hank Santa sent at 10/9/2020 11:31 AM CDT -----  Regarding: [phone]  Kiley is requesting a callback.  She states that she received her test results and she would like to be advised on what to do next; what the next steps are.    Callback number: [phone]

## 2020-10-10 NOTE — TELEPHONE ENCOUNTER
Spoke with patient.  [place] [id number], ESR and CRP are normal, CARMEN weakly positive and further analysis still in process.  Will put in referral to rheumatology.  Gave patient their phone number to make appt

## 2020-10-12 ENCOUNTER — TELEPHONE (OUTPATIENT)
Dept: RHEUMATOLOGY | Facility: CLINIC | Age: 30
End: 2020-10-12

## 2020-10-12 ENCOUNTER — PATIENT MESSAGE (OUTPATIENT)
Dept: RHEUMATOLOGY | Facility: CLINIC | Age: 30
End: 2020-10-12

## 2020-10-12 LAB
ANTI SM ANTIBODY: 0.07 RATIO (ref 0–0.99)
ANTI SM/RNP ANTIBODY: 0.06 RATIO (ref 0–0.99)
ANTI-SM INTERPRETATION: NEGATIVE
ANTI-SM/RNP INTERPRETATION: NEGATIVE
ANTI-SSA ANTIBODY: 0.07 RATIO (ref 0–0.99)
ANTI-SSA INTERPRETATION: NEGATIVE
ANTI-SSB ANTIBODY: 0.08 RATIO (ref 0–0.99)
ANTI-SSB INTERPRETATION: NEGATIVE
DSDNA AB SER-ACNC: NORMAL [IU]/ML

## 2020-10-12 NOTE — TELEPHONE ENCOUNTER
----- Message from Susana Magana sent at 10/12/2020 11:43 AM CDT -----  Pt states she received a call saying that she can come in today for an appt please contact pt           Contact info [phone]

## 2020-10-14 ENCOUNTER — TELEPHONE (OUTPATIENT)
Dept: INTERNAL MEDICINE | Facility: CLINIC | Age: 30
End: 2020-10-14

## 2020-10-14 NOTE — TELEPHONE ENCOUNTER
----- Message from Kathy Anderson sent at 10/14/2020 10:29 AM CDT -----  Regarding: advice  Contact: Father [phone]  Request call back concerning visit

## 2020-10-19 ENCOUNTER — TELEPHONE (OUTPATIENT)
Dept: ORTHOPEDICS | Facility: CLINIC | Age: 30
End: 2020-10-19

## 2020-10-19 DIAGNOSIS — M25.552 BILATERAL HIP PAIN: Primary | ICD-10-CM

## 2020-10-19 DIAGNOSIS — M25.551 BILATERAL HIP PAIN: Primary | ICD-10-CM

## 2020-10-19 NOTE — TELEPHONE ENCOUNTER
Spoke with pt. Pt states she has been having bilateral hip pain for about 4 week. Pain was sharp, tried taking ibuprofen and tylenol and was icing hip. Then it began to feel like a needle was being stuck in her hip. Advised pt xrays will be needed prior to appt. Images ordered and scheduled. All questions answered. Pt verbalized understanding.

## 2020-10-20 ENCOUNTER — TELEPHONE (OUTPATIENT)
Dept: INTERNAL MEDICINE | Facility: CLINIC | Age: 30
End: 2020-10-20

## 2020-10-20 NOTE — TELEPHONE ENCOUNTER
----- Message from Aidee Davis sent at 10/20/2020  8:37 AM CDT -----  Contact: Patient [phone]  Patient was seen on 10/08/2020 and has some questions about the referral that were given.    Please call and advise.    Thank You

## 2020-10-30 ENCOUNTER — HOSPITAL ENCOUNTER (OUTPATIENT)
Dept: RADIOLOGY | Facility: HOSPITAL | Age: 30
Discharge: HOME | End: 2020-10-30
Attending: INTERNAL MEDICINE
Payer: COMMERCIAL

## 2020-10-30 ENCOUNTER — OFFICE VISIT (OUTPATIENT)
Dept: RHEUMATOLOGY | Facility: CLINIC | Age: 30
End: 2020-10-30
Payer: COMMERCIAL

## 2020-10-30 VITALS
SYSTOLIC BLOOD PRESSURE: 114 MMHG | HEART RATE: 75 BPM | WEIGHT: 128.06 LBS | TEMPERATURE: 97 F | HEIGHT: 60 IN | BODY MASS INDEX: 25.14 KG/M2 | DIASTOLIC BLOOD PRESSURE: 74 MMHG

## 2020-10-30 DIAGNOSIS — M25.551 BILATERAL HIP PAIN: ICD-10-CM

## 2020-10-30 DIAGNOSIS — M25.552 BILATERAL HIP PAIN: Primary | ICD-10-CM

## 2020-10-30 DIAGNOSIS — R76.8 POSITIVE ANA (ANTINUCLEAR ANTIBODY): ICD-10-CM

## 2020-10-30 DIAGNOSIS — M25.552 BILATERAL HIP PAIN: ICD-10-CM

## 2020-10-30 DIAGNOSIS — M77.9 TENDONITIS: ICD-10-CM

## 2020-10-30 DIAGNOSIS — M25.551 BILATERAL HIP PAIN: Primary | ICD-10-CM

## 2020-10-30 PROCEDURE — 3008F PR BODY MASS INDEX (BMI) DOCUMENTED: ICD-10-PCS | Mod: CPTII,S$GLB,, | Performed by: INTERNAL MEDICINE

## 2020-10-30 PROCEDURE — 99999 PR PBB SHADOW E&M-EST. PATIENT-LVL IV: ICD-10-PCS | Mod: PBBFAC,,, | Performed by: INTERNAL MEDICINE

## 2020-10-30 PROCEDURE — 73521 X-RAY EXAM HIPS BI 2 VIEWS: CPT | Mod: TC

## 2020-10-30 PROCEDURE — 72202 X-RAY EXAM SI JOINTS 3/> VWS: CPT | Mod: TC

## 2020-10-30 PROCEDURE — 99205 PR OFFICE/OUTPT VISIT, NEW, LEVL V, 60-74 MIN: ICD-10-PCS | Mod: S$GLB,,, | Performed by: INTERNAL MEDICINE

## 2020-10-30 PROCEDURE — 73521 XR HIPS BILATERAL 2 VIEW INCL AP PELVIS: ICD-10-PCS | Mod: 26,,, | Performed by: RADIOLOGY

## 2020-10-30 PROCEDURE — 72202 XR SACROILIAC JOINTS COMPLETE: ICD-10-PCS | Mod: 26,,, | Performed by: RADIOLOGY

## 2020-10-30 PROCEDURE — 72202 X-RAY EXAM SI JOINTS 3/> VWS: CPT | Mod: 26,,, | Performed by: RADIOLOGY

## 2020-10-30 PROCEDURE — 73521 X-RAY EXAM HIPS BI 2 VIEWS: CPT | Mod: 26,,, | Performed by: RADIOLOGY

## 2020-10-30 PROCEDURE — 3008F BODY MASS INDEX DOCD: CPT | Mod: CPTII,S$GLB,, | Performed by: INTERNAL MEDICINE

## 2020-10-30 PROCEDURE — 99205 OFFICE O/P NEW HI 60 MIN: CPT | Mod: S$GLB,,, | Performed by: INTERNAL MEDICINE

## 2020-10-30 PROCEDURE — 99999 PR PBB SHADOW E&M-EST. PATIENT-LVL IV: CPT | Mod: PBBFAC,,, | Performed by: INTERNAL MEDICINE

## 2020-10-30 RX ORDER — MELOXICAM 15 MG/1
15 TABLET ORAL DAILY
Qty: 30 TABLET | Refills: 1 | Status: SHIPPED | OUTPATIENT
Start: 2020-10-30 | End: 2020-12-01

## 2020-10-30 NOTE — LETTER
November 3, 2020      Gaby Hernandez MD  [address]  [facility] [address]           [id number]  [address]  [facility] [address]  Phone: [phone]  Fax: [phone]          Patient: Kiley Vyas   MR Number: [id number]   YOB: 1990   Date of Visit: 10/30/2020       Dear Dr. Gaby Hernandez:    Thank you for referring Kiley Vyas to me for evaluation. Attached you will find relevant portions of my assessment and plan of care.    If you have questions, please do not hesitate to call me. I look forward to following Kiley Vyas along with you.    Sincerely,    Sarah Giron MD    Enclosure  CC:  No Recipients    If you would like to receive this communication electronically, please contact [email or web address] or [phone] to request more information on [employer] Link access.    For providers and/or their staff who would like to refer a patient to [facility], please contact us through our one-stop-shop provider referral line, [facility], at [phone].    If you feel you have received this communication in error or would no longer like to receive these types of communications, please e-mail [email or web address]

## 2020-10-30 NOTE — PROGRESS NOTES
Subjective:       Patient ID: Kiley Vyas is a 30 y.o. female.    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  Since September 14, she is having pain in both hips.  She tried naproxen 550mg po BID and it did not help. She was prescribed marcrobid for possible UTI. Treatment of UTI did not make a difference.  She took ibuprofen 800mg a day without improvement. She then developed sternal pain. She had sternal pain a year ago and it went away with medrol pack.  She tried medrol pack again but it didn't help with the hip pain.  She had steroid shot IM but it didn't help much.  Pain is constant and is not triggered by movement.  Denies joint swelling or stiffness. Denies any rashes, oral ulcers, fevers, raynauds,.  Usually pain is more severe on one side. Denies any abdominal pain or bloody stools.  Denies any illness before this started. Pain level is 5/10.    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 Negative for activity change, appetite change, chills, diaphoresis, fever and unexpected weight change.   HENT: Negative for congestion, ear discharge, ear pain, facial swelling, mouth sores, sinus pressure, sneezing, sore throat, tinnitus and trouble swallowing.    Eyes: Negative for photophobia, pain, discharge, redness, itching and visual disturbance.   Respiratory: Negative for apnea, cough, chest tightness, shortness of breath, wheezing and stridor.    Cardiovascular: Negative for chest pain and leg swelling.   Gastrointestinal: Negative for abdominal distention, abdominal pain, anal bleeding, blood in stool, constipation, diarrhea and nausea.   Endocrine: Negative for cold intolerance and heat intolerance.   Genitourinary: Negative for difficulty urinating, dysuria and  genital sores.   Musculoskeletal: Positive for arthralgias. Negative for back pain, gait problem, joint swelling, myalgias, neck pain and neck stiffness.   Skin: Negative for color change, pallor, rash and wound.   Neurological: Negative for dizziness, seizures, light-headedness, numbness and headaches.   Hematological: Negative for adenopathy. Does not bruise/bleed easily.   Psychiatric/Behavioral: Negative for sleep disturbance. The patient is not nervous/anxious.              Objective:   /74   Pulse 75   Temp 97.1 °F (36.2 °C)   Ht 5' (1.524 m)   Wt 58.1 kg (128 lb 1.4 oz)   BMI 25.02 kg/m²      Physical Exam   Constitutional: She is oriented to person, place, and time.   HENT:   Head: Normocephalic and atraumatic.   Right Ear: External ear normal.   Left Ear: External ear normal.   Nose: Nose normal.   Mouth/Throat: Oropharynx is clear and moist. No oropharyngeal exudate.   Eyes: Conjunctivae and EOM are normal. Pupils are equal, round, and reactive to light. Right eye exhibits no discharge. Left eye exhibits no discharge. No scleral icterus.   Neck: Neck supple. No JVD present. No thyromegaly present.   Cardiovascular: Normal rate, regular rhythm, normal heart sounds and intact distal pulses.  Exam reveals no gallop and no friction rub.    No murmur heard.  Pulmonary/Chest: Effort normal and breath sounds normal. No respiratory distress. She has no wheezes. She has no rales. She exhibits no tenderness.   Abdominal: Soft. Bowel sounds are normal. She exhibits no distension and no mass. There is no abdominal tenderness. There is no rebound and no guarding.   Lymphadenopathy:     She has no cervical adenopathy.   Neurological: She is alert and oriented to person, place, and time. No cranial nerve deficit. Gait normal. Coordination normal.   Skin: Skin is dry. No rash noted. No erythema. No pallor.     Psychiatric: Affect and judgment normal.   Musculoskeletal: No tenderness, deformity or edema.            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  Low suspicion for pso A but will finish up the work up.  1. Tendonitis    2. Positive CARMEN (antinuclear antibody)            Plan:       Problem List Items Addressed This Visit     None      Visit Diagnoses     Tendonitis        Positive CARMEN (antinuclear antibody)            Labs  xrays  One hour of face to face time spent with patient and family*

## 2020-11-09 ENCOUNTER — PATIENT MESSAGE (OUTPATIENT)
Dept: RHEUMATOLOGY | Facility: CLINIC | Age: 30
End: 2020-11-09

## 2020-11-10 ENCOUNTER — PATIENT MESSAGE (OUTPATIENT)
Dept: RHEUMATOLOGY | Facility: CLINIC | Age: 30
End: 2020-11-10

## 2020-11-10 DIAGNOSIS — M25.551 CHRONIC HIP PAIN, RIGHT: ICD-10-CM

## 2020-11-10 DIAGNOSIS — M25.559 PAIN IN UNSPECIFIED HIP: ICD-10-CM

## 2020-11-10 DIAGNOSIS — M25.552 HIP PAIN, CHRONIC, LEFT: Primary | ICD-10-CM

## 2020-11-10 DIAGNOSIS — G89.29 HIP PAIN, CHRONIC, LEFT: Primary | ICD-10-CM

## 2020-11-10 DIAGNOSIS — G89.29 CHRONIC HIP PAIN, RIGHT: ICD-10-CM

## 2020-11-12 DIAGNOSIS — F32.A DEPRESSION, UNSPECIFIED DEPRESSION TYPE: ICD-10-CM

## 2020-11-12 NOTE — TELEPHONE ENCOUNTER
----- Message from Lesley Kenney sent at 11/12/2020 12:42 PM CST -----  Regarding: Refill  Can the clinic reply in [facility]: NO  Please refill the medication(s) listed below. Please call the patient when the prescription(s) is ready for  at this phone [phone]    Medication #1 -citalopram (CELEXA) 20 MG tablet Preferred Pharmacy: [facility] [address] [place] [address] [address] [facility] [address]

## 2020-11-13 RX ORDER — CITALOPRAM 20 MG/1
TABLET, FILM COATED ORAL
Qty: 90 TABLET | Refills: 4 | Status: SHIPPED | OUTPATIENT
Start: 2020-11-13 | End: 2021-11-30 | Stop reason: SDUPTHER

## 2020-11-20 NOTE — PROGRESS NOTES
Spoke with patient regarding her results and let her know, I will set her up with sports medicine for right labral tear.  Patient verbalized understanding.

## 2020-12-01 ENCOUNTER — TELEPHONE (OUTPATIENT)
Dept: DIABETES | Facility: CLINIC | Age: 30
End: 2020-12-01

## 2020-12-01 ENCOUNTER — OFFICE VISIT (OUTPATIENT)
Dept: ORTHOPEDICS | Facility: CLINIC | Age: 30
End: 2020-12-01
Payer: COMMERCIAL

## 2020-12-01 VITALS
DIASTOLIC BLOOD PRESSURE: 78 MMHG | BODY MASS INDEX: 25.76 KG/M2 | WEIGHT: 131.19 LBS | HEART RATE: 81 BPM | SYSTOLIC BLOOD PRESSURE: 115 MMHG | HEIGHT: 60 IN

## 2020-12-01 DIAGNOSIS — M25.551 HIP PAIN, RIGHT: Primary | ICD-10-CM

## 2020-12-01 DIAGNOSIS — S73.191A TEAR OF RIGHT ACETABULAR LABRUM, INITIAL ENCOUNTER: ICD-10-CM

## 2020-12-01 PROCEDURE — 99999 PR PBB SHADOW E&M-EST. PATIENT-LVL IV: ICD-10-PCS | Mod: PBBFAC,,, | Performed by: ORTHOPAEDIC SURGERY

## 2020-12-01 PROCEDURE — 99999 PR PBB SHADOW E&M-EST. PATIENT-LVL IV: CPT | Mod: PBBFAC,,, | Performed by: ORTHOPAEDIC SURGERY

## 2020-12-01 PROCEDURE — 3008F PR BODY MASS INDEX (BMI) DOCUMENTED: ICD-10-PCS | Mod: CPTII,S$GLB,, | Performed by: ORTHOPAEDIC SURGERY

## 2020-12-01 PROCEDURE — 1125F AMNT PAIN NOTED PAIN PRSNT: CPT | Mod: S$GLB,,, | Performed by: ORTHOPAEDIC SURGERY

## 2020-12-01 PROCEDURE — 3008F BODY MASS INDEX DOCD: CPT | Mod: CPTII,S$GLB,, | Performed by: ORTHOPAEDIC SURGERY

## 2020-12-01 PROCEDURE — 1125F PR PAIN SEVERITY QUANTIFIED, PAIN PRESENT: ICD-10-PCS | Mod: S$GLB,,, | Performed by: ORTHOPAEDIC SURGERY

## 2020-12-01 PROCEDURE — 99203 PR OFFICE/OUTPT VISIT, NEW, LEVL III, 30-44 MIN: ICD-10-PCS | Mod: S$GLB,,, | Performed by: ORTHOPAEDIC SURGERY

## 2020-12-01 PROCEDURE — 99203 OFFICE O/P NEW LOW 30 MIN: CPT | Mod: S$GLB,,, | Performed by: ORTHOPAEDIC SURGERY

## 2020-12-01 RX ORDER — DICLOFENAC SODIUM 75 MG/1
75 TABLET, DELAYED RELEASE ORAL 2 TIMES DAILY
Qty: 60 TABLET | Refills: 0 | Status: SHIPPED | OUTPATIENT
Start: 2020-12-01 | End: 2020-12-31

## 2020-12-01 NOTE — TELEPHONE ENCOUNTER
Mother advised would like MRI shows torn anterosuperior RIGHT-sided labral tear. She stated her daughter was told it was not..  And advised if requires surgery and  would like surgery if option from you. She does not believe that it is an issue that should be life long and wants it fix. I advised that surgery may not be option and PT is appropriate but would contact Dr Mcclellan.  Also was told that another medication was prescribed for her and was not given.

## 2020-12-01 NOTE — PROGRESS NOTES
Subjective:    Patient ID:  Kiley Vyas is a 30 y.o. y.o. female who presents for initial visit for Pain of the Right Hip      29 yo female reports 2 month h/o right hip pain, insidious onset. Pain constant, sharp and localized anteriorly. Notes mild aggravation with stair climbing. States she had been working out on [employer] bike prior to symptom onset. Denies groin pain, locking, catching, popping, LBP, motor weakness, paresthesias or constitutional sxs. Had recent right hip MRI which was reported to show a labral tear. She has been taking Mobic without relief. Has not had any other specific treatment to date.           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    Seborrheic dermatitis     Strabismus         Past Surgical History:   Procedure Laterality Date    STRABISMUS SURGERY  05/06/1994    Denervation and extirpation of IO ou    STRABISMUS SURGERY  05/12/2010    Recession LR OD 6mm, Resection MR OD 5mm       Review of patient's allergies indicates:  No Known Allergies       Current Outpatient Medications:     citalopram (CELEXA) 20 MG tablet, TAKE 1 TABLET(20 MG) BY MOUTH EVERY DAY, Disp: 90 tablet, Rfl: 4    HYDROcodone-acetaminophen (NORCO) 5-325 mg per tablet, Take 1 tablet by mouth every 6 (six) hours as needed for Pain., Disp: 20 tablet, Rfl: 0    meloxicam (MOBIC) 15 MG tablet, Take 1 tablet (15 mg total) by mouth once daily., Disp: 30 tablet, Rfl: 1    Social History     Socioeconomic History    Marital status: Single     Spouse name: Not on file    Number of children: Not on file    Years of education: Not on file    Highest education level: Not on file   Occupational History    Not on file   Social Needs    Financial resource strain: Not on file    Food insecurity     Worry: Not on file     Inability: Not on file    Transportation needs     Medical: Not on file     Non-medical: Not on file   Tobacco Use    Smoking status: Never Smoker     Smokeless tobacco: Never Used   Substance and Sexual Activity    Alcohol use: Yes     Comment: socia;    Drug use: No    Sexual activity: Not Currently     Partners: Male     Birth control/protection: None   Lifestyle    Physical activity     Days per week: Not on file     Minutes per session: Not on file    Stress: Not on file   Relationships    Social connections     Talks on phone: Not on file     Gets together: Not on file     Attends [employer]ism service: Not on file     Active member of club or organization: Not on file     Attends meetings of clubs or organizations: Not on file     Relationship status: Not on file   Other Topics Concern    Are you pregnant or think you may be? No    Breast-feeding No   Social History Narrative    Not on file        Family History   Problem Relation Age of Onset    Colon cancer Mother 51    Diabetes Paternal Grandfather     Cataracts Maternal Grandmother     Breast cancer Neg Hx     Ovarian cancer Neg Hx         Review of Systems   Constitutional: Negative for chills and fever.   HENT: Negative for hearing loss.    Eyes: Negative for blurred vision.   Respiratory: Negative for shortness of breath.    Cardiovascular: Negative for chest pain.   Gastrointestinal: Negative for nausea and vomiting.   Genitourinary: Negative for dysuria.   Musculoskeletal: Negative for myalgias.   Skin: Negative for rash.   Neurological: Negative for speech change and loss of consciousness.   Endo/Heme/Allergies: Does not bruise/bleed easily.   Psychiatric/Behavioral: Negative for depression.        Objective:     /78 (BP Location: Left arm, Patient Position: Sitting, BP Method: Small (Automatic))   Pulse 81   Ht 5' (1.524 m)   Wt 59.5 kg (131 lb 2.8 oz)   BMI 25.62 kg/m²     Ortho Exam     31 yo female in NAD; alert, oriented x 3; normal mood and affect    Head: atraumatic  Eyes: EOM are normal. Right eye exhibits no discharge. Left eye exhibits no discharge  Cardiovascular:  normal rate    Pulmonary/Chest: effort normal; no respiratory distress  Abdominal: soft    BLE: N/V intact; no edema    Right hip: no swelling; tender ASIS; FROM; negative impingement signs    Imaging:     X-rays AP pelvis, lateral bilateral hip dated 10/30/2020 are independently reviewed by me and show well-maintained joint spaces; calcification superolateral acetabular labrum; small geographic cystic lesion femoral head neck junction; negative cam lesion/cross-over sign      Assessment & Plan:      1.  Right hip pain, flexor (rectus) tendonitis; suspect incidental labral tear on MRI    1.  Findings, diagnosis, treatment options/risks/benefits were reviewed  2.  Voltaren 75 mg po bid (discontinue Mobic)  3.  PT  4.  Maintain judicious activity modification/limitation  5.  Follow-up in 2 months

## 2020-12-01 NOTE — LETTER
December 1, 2020      Sarah Giron MD  [address]  [address]  [facility] [address]           [facility]  Orthopedics  [address], [facility] [address]  [facility] [address]  Phone: [phone]  Fax: [phone]          Patient: Kiley Vyas   MR Number: [id number]   YOB: 1990   Date of Visit: 12/1/2020       Dear Dr. Sarah Giron:    Thank you for referring Kiley Vyas to me for evaluation. Attached you will find relevant portions of my assessment and plan of care.    If you have questions, please do not hesitate to call me. I look forward to following Kiley Vyas along with you.    Sincerely,    Daniel Boston MD    Enclosure  CC:  No Recipients    If you would like to receive this communication electronically, please contact [email or web address] or [phone] to request more information on [employer] Link access.    For providers and/or their staff who would like to refer a patient to [facility], please contact us through our one-stop-shop provider referral line, [facility], at [phone].    If you feel you have received this communication in error or would no longer like to receive these types of communications, please e-mail [email or web address]

## 2020-12-02 NOTE — TELEPHONE ENCOUNTER
Spoke with pt. Pt states she is wondering if she should take the diclofenac since there is a contraindication due to pt taking Celexa. Advised pt that she should not take diclofenac and if she needs any medication for pain to take  Tylenol. All questions answered. Pt verbalized understanding.

## 2020-12-02 NOTE — TELEPHONE ENCOUNTER
----- Message from Montserrat Gonzalez sent at 12/2/2020 12:14 PM CST -----  Contact: Kiley @ [phone]  Pt needs a call back concerning the medication prescribed yesterday will cause her to have a GI bleeding with the Celexa she is taking? She is requesting for something different.

## 2020-12-23 ENCOUNTER — TELEPHONE (OUTPATIENT)
Dept: ORTHOPEDICS | Facility: CLINIC | Age: 30
End: 2020-12-23

## 2020-12-23 NOTE — TELEPHONE ENCOUNTER
Spoke with pt. Pt states she is doing her PT but her hip pain is not getting any better. Pt states she would like an appt to come in and discuss maybe getting a cortisone injection. Pt states she is not taking the diclofenac prescribed due to the interactions with the citalopram she is taking. appt scheduled to have hip reevaluated. All questions answered. Pt verbalized understanding.

## 2020-12-23 NOTE — TELEPHONE ENCOUNTER
----- Message from Meredith Cummings sent at 12/23/2020 10:53 AM CST -----  Pt is calling about a medication that was filled by another provider and Cortizone injection asking for a call back.    Contact info [phone]

## 2021-02-01 ENCOUNTER — OFFICE VISIT (OUTPATIENT)
Dept: ORTHOPEDICS | Facility: CLINIC | Age: 31
End: 2021-02-01
Payer: COMMERCIAL

## 2021-02-01 VITALS — WEIGHT: 130.06 LBS | TEMPERATURE: 98 F | BODY MASS INDEX: 23.93 KG/M2 | RESPIRATION RATE: 16 BRPM | HEIGHT: 62 IN

## 2021-02-01 DIAGNOSIS — M76.891 TENDONITIS OF RIGHT HIP FLEXOR: Primary | ICD-10-CM

## 2021-02-01 PROCEDURE — 99999 PR PBB SHADOW E&M-EST. PATIENT-LVL III: CPT | Mod: PBBFAC,,, | Performed by: ORTHOPAEDIC SURGERY

## 2021-02-01 PROCEDURE — 99213 PR OFFICE/OUTPT VISIT, EST, LEVL III, 20-29 MIN: ICD-10-PCS | Mod: S$GLB,,, | Performed by: ORTHOPAEDIC SURGERY

## 2021-02-01 PROCEDURE — 3008F PR BODY MASS INDEX (BMI) DOCUMENTED: ICD-10-PCS | Mod: CPTII,S$GLB,, | Performed by: ORTHOPAEDIC SURGERY

## 2021-02-01 PROCEDURE — 99999 PR PBB SHADOW E&M-EST. PATIENT-LVL III: ICD-10-PCS | Mod: PBBFAC,,, | Performed by: ORTHOPAEDIC SURGERY

## 2021-02-01 PROCEDURE — 3008F BODY MASS INDEX DOCD: CPT | Mod: CPTII,S$GLB,, | Performed by: ORTHOPAEDIC SURGERY

## 2021-02-01 PROCEDURE — 1125F AMNT PAIN NOTED PAIN PRSNT: CPT | Mod: S$GLB,,, | Performed by: ORTHOPAEDIC SURGERY

## 2021-02-01 PROCEDURE — 99213 OFFICE O/P EST LOW 20 MIN: CPT | Mod: S$GLB,,, | Performed by: ORTHOPAEDIC SURGERY

## 2021-02-01 PROCEDURE — 1125F PR PAIN SEVERITY QUANTIFIED, PAIN PRESENT: ICD-10-PCS | Mod: S$GLB,,, | Performed by: ORTHOPAEDIC SURGERY

## 2021-02-02 ENCOUNTER — OFFICE VISIT (OUTPATIENT)
Dept: PAIN MEDICINE | Facility: CLINIC | Age: 31
End: 2021-02-02
Payer: COMMERCIAL

## 2021-02-02 VITALS
HEART RATE: 87 BPM | WEIGHT: 129.19 LBS | DIASTOLIC BLOOD PRESSURE: 80 MMHG | HEIGHT: 62 IN | SYSTOLIC BLOOD PRESSURE: 114 MMHG | BODY MASS INDEX: 23.77 KG/M2 | OXYGEN SATURATION: 98 %

## 2021-02-02 DIAGNOSIS — M76.891 TENDINITIS OF RIGHT HIP: Primary | ICD-10-CM

## 2021-02-02 PROCEDURE — 1125F PR PAIN SEVERITY QUANTIFIED, PAIN PRESENT: ICD-10-PCS | Mod: S$GLB,,, | Performed by: PAIN MEDICINE

## 2021-02-02 PROCEDURE — 3008F PR BODY MASS INDEX (BMI) DOCUMENTED: ICD-10-PCS | Mod: CPTII,S$GLB,, | Performed by: PAIN MEDICINE

## 2021-02-02 PROCEDURE — 1125F AMNT PAIN NOTED PAIN PRSNT: CPT | Mod: S$GLB,,, | Performed by: PAIN MEDICINE

## 2021-02-02 PROCEDURE — 99244 PR OFFICE CONSULTATION,LEVEL IV: ICD-10-PCS | Mod: S$GLB,,, | Performed by: PAIN MEDICINE

## 2021-02-02 PROCEDURE — 99999 PR PBB SHADOW E&M-EST. PATIENT-LVL III: ICD-10-PCS | Mod: PBBFAC,,, | Performed by: PAIN MEDICINE

## 2021-02-02 PROCEDURE — 99244 OFF/OP CNSLTJ NEW/EST MOD 40: CPT | Mod: S$GLB,,, | Performed by: PAIN MEDICINE

## 2021-02-02 PROCEDURE — 3008F BODY MASS INDEX DOCD: CPT | Mod: CPTII,S$GLB,, | Performed by: PAIN MEDICINE

## 2021-02-02 PROCEDURE — 99999 PR PBB SHADOW E&M-EST. PATIENT-LVL III: CPT | Mod: PBBFAC,,, | Performed by: PAIN MEDICINE

## 2021-02-09 ENCOUNTER — TELEPHONE (OUTPATIENT)
Dept: PAIN MEDICINE | Facility: CLINIC | Age: 31
End: 2021-02-09

## 2021-02-09 ENCOUNTER — OFFICE VISIT (OUTPATIENT)
Dept: PAIN MEDICINE | Facility: CLINIC | Age: 31
End: 2021-02-09
Payer: COMMERCIAL

## 2021-02-09 VITALS
HEART RATE: 88 BPM | WEIGHT: 129.19 LBS | HEIGHT: 62 IN | BODY MASS INDEX: 23.77 KG/M2 | SYSTOLIC BLOOD PRESSURE: 109 MMHG | DIASTOLIC BLOOD PRESSURE: 74 MMHG

## 2021-02-09 DIAGNOSIS — M76.891 TENDINITIS OF RIGHT HIP: Primary | ICD-10-CM

## 2021-02-09 PROCEDURE — 1125F PR PAIN SEVERITY QUANTIFIED, PAIN PRESENT: ICD-10-PCS | Mod: S$GLB,,, | Performed by: PAIN MEDICINE

## 2021-02-09 PROCEDURE — 99499 UNLISTED E&M SERVICE: CPT | Mod: S$GLB,,, | Performed by: PAIN MEDICINE

## 2021-02-09 PROCEDURE — 3008F PR BODY MASS INDEX (BMI) DOCUMENTED: ICD-10-PCS | Mod: CPTII,S$GLB,, | Performed by: PAIN MEDICINE

## 2021-02-09 PROCEDURE — 3008F BODY MASS INDEX DOCD: CPT | Mod: CPTII,S$GLB,, | Performed by: PAIN MEDICINE

## 2021-02-09 PROCEDURE — 1125F AMNT PAIN NOTED PAIN PRSNT: CPT | Mod: S$GLB,,, | Performed by: PAIN MEDICINE

## 2021-02-09 PROCEDURE — 99999 PR PBB SHADOW E&M-EST. PATIENT-LVL III: CPT | Mod: PBBFAC,,, | Performed by: PAIN MEDICINE

## 2021-02-09 PROCEDURE — 99999 PR PBB SHADOW E&M-EST. PATIENT-LVL III: ICD-10-PCS | Mod: PBBFAC,,, | Performed by: PAIN MEDICINE

## 2021-02-09 PROCEDURE — 99499 NO LOS: ICD-10-PCS | Mod: S$GLB,,, | Performed by: PAIN MEDICINE

## 2021-02-11 ENCOUNTER — TELEPHONE (OUTPATIENT)
Dept: PRIMARY CARE CLINIC | Facility: CLINIC | Age: 31
End: 2021-02-11

## 2021-02-18 ENCOUNTER — OFFICE VISIT (OUTPATIENT)
Dept: PAIN MEDICINE | Facility: CLINIC | Age: 31
End: 2021-02-18
Payer: COMMERCIAL

## 2021-02-18 VITALS
SYSTOLIC BLOOD PRESSURE: 132 MMHG | TEMPERATURE: 98 F | WEIGHT: 129.19 LBS | HEART RATE: 82 BPM | BODY MASS INDEX: 23.77 KG/M2 | DIASTOLIC BLOOD PRESSURE: 86 MMHG | HEIGHT: 62 IN

## 2021-02-18 DIAGNOSIS — M76.891 TENDINITIS OF RIGHT HIP: Primary | ICD-10-CM

## 2021-02-18 PROCEDURE — 99499 NO LOS: ICD-10-PCS | Mod: S$GLB,,, | Performed by: PAIN MEDICINE

## 2021-02-18 PROCEDURE — 3008F BODY MASS INDEX DOCD: CPT | Mod: CPTII,S$GLB,, | Performed by: PAIN MEDICINE

## 2021-02-18 PROCEDURE — 99999 PR PBB SHADOW E&M-EST. PATIENT-LVL III: CPT | Mod: PBBFAC,,, | Performed by: PAIN MEDICINE

## 2021-02-18 PROCEDURE — 20550 NJX 1 TENDON SHEATH/LIGAMENT: CPT | Mod: RT,S$GLB,, | Performed by: PAIN MEDICINE

## 2021-02-18 PROCEDURE — 76942 PR U/S GUIDANCE FOR NEEDLE GUIDANCE: ICD-10-PCS | Mod: S$GLB,,, | Performed by: PAIN MEDICINE

## 2021-02-18 PROCEDURE — 20550 PR INJECT TENDON SHEATH/LIGAMENT: ICD-10-PCS | Mod: RT,S$GLB,, | Performed by: PAIN MEDICINE

## 2021-02-18 PROCEDURE — 76942 ECHO GUIDE FOR BIOPSY: CPT | Mod: S$GLB,,, | Performed by: PAIN MEDICINE

## 2021-02-18 PROCEDURE — 99999 PR PBB SHADOW E&M-EST. PATIENT-LVL III: ICD-10-PCS | Mod: PBBFAC,,, | Performed by: PAIN MEDICINE

## 2021-02-18 PROCEDURE — 3008F PR BODY MASS INDEX (BMI) DOCUMENTED: ICD-10-PCS | Mod: CPTII,S$GLB,, | Performed by: PAIN MEDICINE

## 2021-02-18 PROCEDURE — 99499 UNLISTED E&M SERVICE: CPT | Mod: S$GLB,,, | Performed by: PAIN MEDICINE

## 2021-02-18 PROCEDURE — 1125F AMNT PAIN NOTED PAIN PRSNT: CPT | Mod: S$GLB,,, | Performed by: PAIN MEDICINE

## 2021-02-18 PROCEDURE — 1125F PR PAIN SEVERITY QUANTIFIED, PAIN PRESENT: ICD-10-PCS | Mod: S$GLB,,, | Performed by: PAIN MEDICINE

## 2021-02-23 ENCOUNTER — PATIENT MESSAGE (OUTPATIENT)
Dept: RHEUMATOLOGY | Facility: CLINIC | Age: 31
End: 2021-02-23

## 2021-03-12 ENCOUNTER — PATIENT MESSAGE (OUTPATIENT)
Dept: FAMILY MEDICINE | Facility: CLINIC | Age: 31
End: 2021-03-12

## 2021-03-12 ENCOUNTER — PATIENT MESSAGE (OUTPATIENT)
Dept: PAIN MEDICINE | Facility: CLINIC | Age: 31
End: 2021-03-12

## 2021-04-01 ENCOUNTER — OFFICE VISIT (OUTPATIENT)
Dept: PAIN MEDICINE | Facility: CLINIC | Age: 31
End: 2021-04-01
Payer: COMMERCIAL

## 2021-04-01 VITALS
OXYGEN SATURATION: 100 % | HEIGHT: 62 IN | HEART RATE: 73 BPM | WEIGHT: 128 LBS | BODY MASS INDEX: 23.55 KG/M2 | SYSTOLIC BLOOD PRESSURE: 130 MMHG | DIASTOLIC BLOOD PRESSURE: 79 MMHG

## 2021-04-01 DIAGNOSIS — M79.2 NEUROPATHIC PAIN: Primary | ICD-10-CM

## 2021-04-01 PROCEDURE — 99999 PR PBB SHADOW E&M-EST. PATIENT-LVL III: ICD-10-PCS | Mod: PBBFAC,,, | Performed by: PAIN MEDICINE

## 2021-04-01 PROCEDURE — 1125F AMNT PAIN NOTED PAIN PRSNT: CPT | Mod: S$GLB,,, | Performed by: PAIN MEDICINE

## 2021-04-01 PROCEDURE — 3008F BODY MASS INDEX DOCD: CPT | Mod: CPTII,S$GLB,, | Performed by: PAIN MEDICINE

## 2021-04-01 PROCEDURE — 99214 OFFICE O/P EST MOD 30 MIN: CPT | Mod: S$GLB,,, | Performed by: PAIN MEDICINE

## 2021-04-01 PROCEDURE — 99214 PR OFFICE/OUTPT VISIT, EST, LEVL IV, 30-39 MIN: ICD-10-PCS | Mod: S$GLB,,, | Performed by: PAIN MEDICINE

## 2021-04-01 PROCEDURE — 1125F PR PAIN SEVERITY QUANTIFIED, PAIN PRESENT: ICD-10-PCS | Mod: S$GLB,,, | Performed by: PAIN MEDICINE

## 2021-04-01 PROCEDURE — 3008F PR BODY MASS INDEX (BMI) DOCUMENTED: ICD-10-PCS | Mod: CPTII,S$GLB,, | Performed by: PAIN MEDICINE

## 2021-04-01 PROCEDURE — 99999 PR PBB SHADOW E&M-EST. PATIENT-LVL III: CPT | Mod: PBBFAC,,, | Performed by: PAIN MEDICINE

## 2021-04-01 RX ORDER — GABAPENTIN 300 MG/1
600 CAPSULE ORAL 3 TIMES DAILY
Qty: 180 CAPSULE | Refills: 11 | Status: SHIPPED | OUTPATIENT
Start: 2021-04-01 | End: 2021-11-10

## 2021-04-14 ENCOUNTER — PATIENT MESSAGE (OUTPATIENT)
Dept: PAIN MEDICINE | Facility: CLINIC | Age: 31
End: 2021-04-14

## 2021-04-15 ENCOUNTER — TELEPHONE (OUTPATIENT)
Dept: PAIN MEDICINE | Facility: CLINIC | Age: 31
End: 2021-04-15

## 2021-04-15 ENCOUNTER — TELEPHONE (OUTPATIENT)
Dept: SPORTS MEDICINE | Facility: CLINIC | Age: 31
End: 2021-04-15

## 2021-04-15 DIAGNOSIS — M76.891 TENDINITIS OF RIGHT HIP: Primary | ICD-10-CM

## 2021-04-20 ENCOUNTER — TELEPHONE (OUTPATIENT)
Dept: PAIN MEDICINE | Facility: CLINIC | Age: 31
End: 2021-04-20

## 2021-04-26 ENCOUNTER — OFFICE VISIT (OUTPATIENT)
Dept: SPORTS MEDICINE | Facility: CLINIC | Age: 31
End: 2021-04-26
Payer: COMMERCIAL

## 2021-04-26 VITALS
WEIGHT: 127 LBS | SYSTOLIC BLOOD PRESSURE: 104 MMHG | BODY MASS INDEX: 23.37 KG/M2 | DIASTOLIC BLOOD PRESSURE: 70 MMHG | HEIGHT: 62 IN

## 2021-04-26 DIAGNOSIS — M21.70 LEG LENGTH DISCREPANCY: ICD-10-CM

## 2021-04-26 DIAGNOSIS — M99.06 SOMATIC DYSFUNCTION OF LOWER EXTREMITY: ICD-10-CM

## 2021-04-26 DIAGNOSIS — M99.02 SOMATIC DYSFUNCTION OF THORACIC REGION: ICD-10-CM

## 2021-04-26 DIAGNOSIS — M79.10 MYALGIA: ICD-10-CM

## 2021-04-26 DIAGNOSIS — M99.03 SOMATIC DYSFUNCTION OF LUMBAR REGION: ICD-10-CM

## 2021-04-26 DIAGNOSIS — M99.08 SOMATIC DYSFUNCTION OF RIB CAGE REGION: ICD-10-CM

## 2021-04-26 DIAGNOSIS — M76.891 ENTHESOPATHY OF RIGHT HIP REGION: ICD-10-CM

## 2021-04-26 DIAGNOSIS — G89.29 CHRONIC RIGHT HIP PAIN: Primary | ICD-10-CM

## 2021-04-26 DIAGNOSIS — M25.551 CHRONIC RIGHT HIP PAIN: Primary | ICD-10-CM

## 2021-04-26 DIAGNOSIS — M99.04 SACRAL REGION SOMATIC DYSFUNCTION: ICD-10-CM

## 2021-04-26 PROCEDURE — 99204 PR OFFICE/OUTPT VISIT, NEW, LEVL IV, 45-59 MIN: ICD-10-PCS | Mod: 25,S$GLB,, | Performed by: NEUROMUSCULOSKELETAL MEDICINE & OMM

## 2021-04-26 PROCEDURE — 3008F PR BODY MASS INDEX (BMI) DOCUMENTED: ICD-10-PCS | Mod: CPTII,S$GLB,, | Performed by: NEUROMUSCULOSKELETAL MEDICINE & OMM

## 2021-04-26 PROCEDURE — 3008F BODY MASS INDEX DOCD: CPT | Mod: CPTII,S$GLB,, | Performed by: NEUROMUSCULOSKELETAL MEDICINE & OMM

## 2021-04-26 PROCEDURE — 97110 THERAPEUTIC EXERCISES: CPT | Mod: S$GLB,,, | Performed by: NEUROMUSCULOSKELETAL MEDICINE & OMM

## 2021-04-26 PROCEDURE — 99999 PR PBB SHADOW E&M-EST. PATIENT-LVL II: CPT | Mod: PBBFAC,,, | Performed by: NEUROMUSCULOSKELETAL MEDICINE & OMM

## 2021-04-26 PROCEDURE — 99999 PR PBB SHADOW E&M-EST. PATIENT-LVL II: ICD-10-PCS | Mod: PBBFAC,,, | Performed by: NEUROMUSCULOSKELETAL MEDICINE & OMM

## 2021-04-26 PROCEDURE — 98927 OSTEOPATH MANJ 5-6 REGIONS: CPT | Mod: S$GLB,,, | Performed by: NEUROMUSCULOSKELETAL MEDICINE & OMM

## 2021-04-26 PROCEDURE — 97110 PR THERAPEUTIC EXERCISES: ICD-10-PCS | Mod: S$GLB,,, | Performed by: NEUROMUSCULOSKELETAL MEDICINE & OMM

## 2021-04-26 PROCEDURE — 99204 OFFICE O/P NEW MOD 45 MIN: CPT | Mod: 25,S$GLB,, | Performed by: NEUROMUSCULOSKELETAL MEDICINE & OMM

## 2021-04-26 PROCEDURE — 98927 PR OSTEOPATHIC MANIP,5-6 BODY REGN: ICD-10-PCS | Mod: S$GLB,,, | Performed by: NEUROMUSCULOSKELETAL MEDICINE & OMM

## 2021-04-30 ENCOUNTER — OFFICE VISIT (OUTPATIENT)
Dept: OPTOMETRY | Facility: CLINIC | Age: 31
End: 2021-04-30
Payer: COMMERCIAL

## 2021-04-30 ENCOUNTER — TELEPHONE (OUTPATIENT)
Dept: PAIN MEDICINE | Facility: CLINIC | Age: 31
End: 2021-04-30

## 2021-04-30 DIAGNOSIS — H52.223 REGULAR ASTIGMATISM OF BOTH EYES: Primary | ICD-10-CM

## 2021-04-30 PROCEDURE — 99999 PR PBB SHADOW E&M-EST. PATIENT-LVL II: ICD-10-PCS | Mod: PBBFAC,,, | Performed by: OPTOMETRIST

## 2021-04-30 PROCEDURE — 92004 COMPRE OPH EXAM NEW PT 1/>: CPT | Mod: S$GLB,,, | Performed by: OPTOMETRIST

## 2021-04-30 PROCEDURE — 92015 PR REFRACTION: ICD-10-PCS | Mod: S$GLB,,, | Performed by: OPTOMETRIST

## 2021-04-30 PROCEDURE — 92015 DETERMINE REFRACTIVE STATE: CPT | Mod: S$GLB,,, | Performed by: OPTOMETRIST

## 2021-04-30 PROCEDURE — 92004 PR EYE EXAM, NEW PATIENT,COMPREHESV: ICD-10-PCS | Mod: S$GLB,,, | Performed by: OPTOMETRIST

## 2021-04-30 PROCEDURE — 99999 PR PBB SHADOW E&M-EST. PATIENT-LVL II: CPT | Mod: PBBFAC,,, | Performed by: OPTOMETRIST

## 2021-05-10 ENCOUNTER — OFFICE VISIT (OUTPATIENT)
Dept: SPORTS MEDICINE | Facility: CLINIC | Age: 31
End: 2021-05-10
Payer: COMMERCIAL

## 2021-05-10 VITALS
BODY MASS INDEX: 23.37 KG/M2 | DIASTOLIC BLOOD PRESSURE: 70 MMHG | WEIGHT: 127 LBS | SYSTOLIC BLOOD PRESSURE: 98 MMHG | HEIGHT: 62 IN

## 2021-05-10 DIAGNOSIS — M76.891 ENTHESOPATHY OF RIGHT HIP REGION: ICD-10-CM

## 2021-05-10 DIAGNOSIS — M99.03 SOMATIC DYSFUNCTION OF LUMBAR REGION: ICD-10-CM

## 2021-05-10 DIAGNOSIS — G89.29 CHRONIC RIGHT HIP PAIN: Primary | ICD-10-CM

## 2021-05-10 DIAGNOSIS — M79.10 MYALGIA: ICD-10-CM

## 2021-05-10 DIAGNOSIS — M25.551 CHRONIC RIGHT HIP PAIN: Primary | ICD-10-CM

## 2021-05-10 DIAGNOSIS — M21.70 LEG LENGTH DISCREPANCY: ICD-10-CM

## 2021-05-10 DIAGNOSIS — M99.05 SOMATIC DYSFUNCTION OF PELVIC REGION: ICD-10-CM

## 2021-05-10 DIAGNOSIS — M99.04 SACRAL REGION SOMATIC DYSFUNCTION: ICD-10-CM

## 2021-05-10 PROCEDURE — 99213 OFFICE O/P EST LOW 20 MIN: CPT | Mod: 25,S$GLB,, | Performed by: NEUROMUSCULOSKELETAL MEDICINE & OMM

## 2021-05-10 PROCEDURE — 97110 THERAPEUTIC EXERCISES: CPT | Mod: S$GLB,,, | Performed by: NEUROMUSCULOSKELETAL MEDICINE & OMM

## 2021-05-10 PROCEDURE — 97110 PR THERAPEUTIC EXERCISES: ICD-10-PCS | Mod: S$GLB,,, | Performed by: NEUROMUSCULOSKELETAL MEDICINE & OMM

## 2021-05-10 PROCEDURE — 98926 PR OSTEOPATHIC MANIP,3-4 BODY REGN: ICD-10-PCS | Mod: S$GLB,,, | Performed by: NEUROMUSCULOSKELETAL MEDICINE & OMM

## 2021-05-10 PROCEDURE — 3008F BODY MASS INDEX DOCD: CPT | Mod: CPTII,S$GLB,, | Performed by: NEUROMUSCULOSKELETAL MEDICINE & OMM

## 2021-05-10 PROCEDURE — 99999 PR PBB SHADOW E&M-EST. PATIENT-LVL III: CPT | Mod: PBBFAC,,, | Performed by: NEUROMUSCULOSKELETAL MEDICINE & OMM

## 2021-05-10 PROCEDURE — 99213 PR OFFICE/OUTPT VISIT, EST, LEVL III, 20-29 MIN: ICD-10-PCS | Mod: 25,S$GLB,, | Performed by: NEUROMUSCULOSKELETAL MEDICINE & OMM

## 2021-05-10 PROCEDURE — 98926 OSTEOPATH MANJ 3-4 REGIONS: CPT | Mod: S$GLB,,, | Performed by: NEUROMUSCULOSKELETAL MEDICINE & OMM

## 2021-05-10 PROCEDURE — 99999 PR PBB SHADOW E&M-EST. PATIENT-LVL III: ICD-10-PCS | Mod: PBBFAC,,, | Performed by: NEUROMUSCULOSKELETAL MEDICINE & OMM

## 2021-05-10 PROCEDURE — 3008F PR BODY MASS INDEX (BMI) DOCUMENTED: ICD-10-PCS | Mod: CPTII,S$GLB,, | Performed by: NEUROMUSCULOSKELETAL MEDICINE & OMM

## 2021-05-31 ENCOUNTER — OFFICE VISIT (OUTPATIENT)
Dept: SPORTS MEDICINE | Facility: CLINIC | Age: 31
End: 2021-05-31
Payer: COMMERCIAL

## 2021-05-31 VITALS
WEIGHT: 127 LBS | BODY MASS INDEX: 23.37 KG/M2 | HEIGHT: 62 IN | SYSTOLIC BLOOD PRESSURE: 104 MMHG | DIASTOLIC BLOOD PRESSURE: 72 MMHG

## 2021-05-31 DIAGNOSIS — M25.551 CHRONIC RIGHT HIP PAIN: Primary | ICD-10-CM

## 2021-05-31 DIAGNOSIS — G89.29 CHRONIC RIGHT HIP PAIN: Primary | ICD-10-CM

## 2021-05-31 DIAGNOSIS — M99.05 SOMATIC DYSFUNCTION OF PELVIC REGION: ICD-10-CM

## 2021-05-31 DIAGNOSIS — M99.06 SOMATIC DYSFUNCTION OF LOWER EXTREMITY: ICD-10-CM

## 2021-05-31 DIAGNOSIS — M76.891 ENTHESOPATHY OF RIGHT HIP REGION: ICD-10-CM

## 2021-05-31 DIAGNOSIS — M79.10 MYALGIA: ICD-10-CM

## 2021-05-31 DIAGNOSIS — M21.70 LEG LENGTH DISCREPANCY: ICD-10-CM

## 2021-05-31 DIAGNOSIS — M99.04 SACRAL REGION SOMATIC DYSFUNCTION: ICD-10-CM

## 2021-05-31 DIAGNOSIS — M99.03 SOMATIC DYSFUNCTION OF LUMBAR REGION: ICD-10-CM

## 2021-05-31 PROCEDURE — 98926 PR OSTEOPATHIC MANIP,3-4 BODY REGN: ICD-10-PCS | Mod: S$GLB,,, | Performed by: NEUROMUSCULOSKELETAL MEDICINE & OMM

## 2021-05-31 PROCEDURE — 99213 PR OFFICE/OUTPT VISIT, EST, LEVL III, 20-29 MIN: ICD-10-PCS | Mod: 25,S$GLB,, | Performed by: NEUROMUSCULOSKELETAL MEDICINE & OMM

## 2021-05-31 PROCEDURE — 3008F PR BODY MASS INDEX (BMI) DOCUMENTED: ICD-10-PCS | Mod: CPTII,S$GLB,, | Performed by: NEUROMUSCULOSKELETAL MEDICINE & OMM

## 2021-05-31 PROCEDURE — 99213 OFFICE O/P EST LOW 20 MIN: CPT | Mod: 25,S$GLB,, | Performed by: NEUROMUSCULOSKELETAL MEDICINE & OMM

## 2021-05-31 PROCEDURE — 99999 PR PBB SHADOW E&M-EST. PATIENT-LVL II: CPT | Mod: PBBFAC,,, | Performed by: NEUROMUSCULOSKELETAL MEDICINE & OMM

## 2021-05-31 PROCEDURE — 99999 PR PBB SHADOW E&M-EST. PATIENT-LVL II: ICD-10-PCS | Mod: PBBFAC,,, | Performed by: NEUROMUSCULOSKELETAL MEDICINE & OMM

## 2021-05-31 PROCEDURE — 3008F BODY MASS INDEX DOCD: CPT | Mod: CPTII,S$GLB,, | Performed by: NEUROMUSCULOSKELETAL MEDICINE & OMM

## 2021-05-31 PROCEDURE — 98926 OSTEOPATH MANJ 3-4 REGIONS: CPT | Mod: S$GLB,,, | Performed by: NEUROMUSCULOSKELETAL MEDICINE & OMM

## 2021-08-02 ENCOUNTER — OFFICE VISIT (OUTPATIENT)
Dept: SPORTS MEDICINE | Facility: CLINIC | Age: 31
End: 2021-08-02
Payer: COMMERCIAL

## 2021-08-02 VITALS
BODY MASS INDEX: 23.37 KG/M2 | SYSTOLIC BLOOD PRESSURE: 100 MMHG | DIASTOLIC BLOOD PRESSURE: 60 MMHG | WEIGHT: 127 LBS | HEIGHT: 62 IN

## 2021-08-02 DIAGNOSIS — M25.551 CHRONIC RIGHT HIP PAIN: Primary | ICD-10-CM

## 2021-08-02 DIAGNOSIS — M21.70 LEG LENGTH DISCREPANCY: ICD-10-CM

## 2021-08-02 DIAGNOSIS — G89.29 CHRONIC RIGHT HIP PAIN: Primary | ICD-10-CM

## 2021-08-02 DIAGNOSIS — M79.10 MYALGIA: ICD-10-CM

## 2021-08-02 DIAGNOSIS — M99.04 SACRAL REGION SOMATIC DYSFUNCTION: ICD-10-CM

## 2021-08-02 DIAGNOSIS — M76.891 ENTHESOPATHY OF RIGHT HIP REGION: ICD-10-CM

## 2021-08-02 DIAGNOSIS — M99.03 SOMATIC DYSFUNCTION OF LUMBAR REGION: ICD-10-CM

## 2021-08-02 DIAGNOSIS — M99.02 SOMATIC DYSFUNCTION OF THORACIC REGION: ICD-10-CM

## 2021-08-02 PROCEDURE — 1160F PR REVIEW ALL MEDS BY PRESCRIBER/CLIN PHARMACIST DOCUMENTED: ICD-10-PCS | Mod: CPTII,S$GLB,, | Performed by: NEUROMUSCULOSKELETAL MEDICINE & OMM

## 2021-08-02 PROCEDURE — 99999 PR PBB SHADOW E&M-EST. PATIENT-LVL III: ICD-10-PCS | Mod: PBBFAC,,, | Performed by: NEUROMUSCULOSKELETAL MEDICINE & OMM

## 2021-08-02 PROCEDURE — 3074F SYST BP LT 130 MM HG: CPT | Mod: CPTII,S$GLB,, | Performed by: NEUROMUSCULOSKELETAL MEDICINE & OMM

## 2021-08-02 PROCEDURE — 3008F BODY MASS INDEX DOCD: CPT | Mod: CPTII,S$GLB,, | Performed by: NEUROMUSCULOSKELETAL MEDICINE & OMM

## 2021-08-02 PROCEDURE — 98926 OSTEOPATH MANJ 3-4 REGIONS: CPT | Mod: S$GLB,,, | Performed by: NEUROMUSCULOSKELETAL MEDICINE & OMM

## 2021-08-02 PROCEDURE — 3078F DIAST BP <80 MM HG: CPT | Mod: CPTII,S$GLB,, | Performed by: NEUROMUSCULOSKELETAL MEDICINE & OMM

## 2021-08-02 PROCEDURE — 3074F PR MOST RECENT SYSTOLIC BLOOD PRESSURE < 130 MM HG: ICD-10-PCS | Mod: CPTII,S$GLB,, | Performed by: NEUROMUSCULOSKELETAL MEDICINE & OMM

## 2021-08-02 PROCEDURE — 97110 THERAPEUTIC EXERCISES: CPT | Mod: S$GLB,,, | Performed by: NEUROMUSCULOSKELETAL MEDICINE & OMM

## 2021-08-02 PROCEDURE — 99213 PR OFFICE/OUTPT VISIT, EST, LEVL III, 20-29 MIN: ICD-10-PCS | Mod: 25,S$GLB,, | Performed by: NEUROMUSCULOSKELETAL MEDICINE & OMM

## 2021-08-02 PROCEDURE — 1159F PR MEDICATION LIST DOCUMENTED IN MEDICAL RECORD: ICD-10-PCS | Mod: CPTII,S$GLB,, | Performed by: NEUROMUSCULOSKELETAL MEDICINE & OMM

## 2021-08-02 PROCEDURE — 98926 PR OSTEOPATHIC MANIP,3-4 BODY REGN: ICD-10-PCS | Mod: S$GLB,,, | Performed by: NEUROMUSCULOSKELETAL MEDICINE & OMM

## 2021-08-02 PROCEDURE — 1160F RVW MEDS BY RX/DR IN RCRD: CPT | Mod: CPTII,S$GLB,, | Performed by: NEUROMUSCULOSKELETAL MEDICINE & OMM

## 2021-08-02 PROCEDURE — 3008F PR BODY MASS INDEX (BMI) DOCUMENTED: ICD-10-PCS | Mod: CPTII,S$GLB,, | Performed by: NEUROMUSCULOSKELETAL MEDICINE & OMM

## 2021-08-02 PROCEDURE — 1159F MED LIST DOCD IN RCRD: CPT | Mod: CPTII,S$GLB,, | Performed by: NEUROMUSCULOSKELETAL MEDICINE & OMM

## 2021-08-02 PROCEDURE — 3078F PR MOST RECENT DIASTOLIC BLOOD PRESSURE < 80 MM HG: ICD-10-PCS | Mod: CPTII,S$GLB,, | Performed by: NEUROMUSCULOSKELETAL MEDICINE & OMM

## 2021-08-02 PROCEDURE — 97110 PR THERAPEUTIC EXERCISES: ICD-10-PCS | Mod: S$GLB,,, | Performed by: NEUROMUSCULOSKELETAL MEDICINE & OMM

## 2021-08-02 PROCEDURE — 99213 OFFICE O/P EST LOW 20 MIN: CPT | Mod: 25,S$GLB,, | Performed by: NEUROMUSCULOSKELETAL MEDICINE & OMM

## 2021-08-02 PROCEDURE — 99999 PR PBB SHADOW E&M-EST. PATIENT-LVL III: CPT | Mod: PBBFAC,,, | Performed by: NEUROMUSCULOSKELETAL MEDICINE & OMM

## 2021-10-22 ENCOUNTER — PATIENT MESSAGE (OUTPATIENT)
Dept: SPORTS MEDICINE | Facility: CLINIC | Age: 31
End: 2021-10-22
Payer: COMMERCIAL

## 2021-11-05 ENCOUNTER — PATIENT MESSAGE (OUTPATIENT)
Dept: INTERNAL MEDICINE | Facility: CLINIC | Age: 31
End: 2021-11-05
Payer: COMMERCIAL

## 2021-11-06 ENCOUNTER — TELEPHONE (OUTPATIENT)
Dept: INTERNAL MEDICINE | Facility: CLINIC | Age: 31
End: 2021-11-06
Payer: COMMERCIAL

## 2021-11-10 ENCOUNTER — OFFICE VISIT (OUTPATIENT)
Dept: PRIMARY CARE CLINIC | Facility: CLINIC | Age: 31
End: 2021-11-10
Payer: COMMERCIAL

## 2021-11-10 ENCOUNTER — PATIENT MESSAGE (OUTPATIENT)
Dept: RHEUMATOLOGY | Facility: CLINIC | Age: 31
End: 2021-11-10
Payer: COMMERCIAL

## 2021-11-10 VITALS
TEMPERATURE: 98 F | SYSTOLIC BLOOD PRESSURE: 108 MMHG | DIASTOLIC BLOOD PRESSURE: 68 MMHG | HEIGHT: 62 IN | WEIGHT: 130.5 LBS | OXYGEN SATURATION: 99 % | HEART RATE: 80 BPM | BODY MASS INDEX: 24.01 KG/M2 | RESPIRATION RATE: 16 BRPM

## 2021-11-10 DIAGNOSIS — M94.0 COSTOCHONDRITIS: Primary | ICD-10-CM

## 2021-11-10 DIAGNOSIS — Z11.4 ENCOUNTER FOR SCREENING FOR HIV: ICD-10-CM

## 2021-11-10 DIAGNOSIS — Z23 IMMUNIZATION DUE: ICD-10-CM

## 2021-11-10 DIAGNOSIS — Z23 NEED FOR VACCINATION: ICD-10-CM

## 2021-11-10 DIAGNOSIS — Z11.59 NEED FOR HEPATITIS C SCREENING TEST: ICD-10-CM

## 2021-11-10 DIAGNOSIS — Z13.6 SCREENING FOR CARDIOVASCULAR CONDITION: ICD-10-CM

## 2021-11-10 DIAGNOSIS — M79.2 NEUROPATHIC PAIN: ICD-10-CM

## 2021-11-10 DIAGNOSIS — R07.89 MID STERNAL CHEST PAIN: ICD-10-CM

## 2021-11-10 PROCEDURE — 93005 ELECTROCARDIOGRAM TRACING: CPT | Mod: S$GLB,,, | Performed by: STUDENT IN AN ORGANIZED HEALTH CARE EDUCATION/TRAINING PROGRAM

## 2021-11-10 PROCEDURE — 90714 TD VACCINE GREATER THAN OR EQUAL TO 7YO PRESERVATIVE FREE IM: ICD-10-PCS | Mod: S$GLB,,, | Performed by: STUDENT IN AN ORGANIZED HEALTH CARE EDUCATION/TRAINING PROGRAM

## 2021-11-10 PROCEDURE — 93010 ELECTROCARDIOGRAM REPORT: CPT | Mod: S$GLB,,, | Performed by: INTERNAL MEDICINE

## 2021-11-10 PROCEDURE — 90471 FLU VACCINE (QUAD) GREATER THAN OR EQUAL TO 3YO PRESERVATIVE FREE IM: ICD-10-PCS | Mod: S$GLB,,, | Performed by: STUDENT IN AN ORGANIZED HEALTH CARE EDUCATION/TRAINING PROGRAM

## 2021-11-10 PROCEDURE — 99213 PR OFFICE/OUTPT VISIT, EST, LEVL III, 20-29 MIN: ICD-10-PCS | Mod: 25,S$GLB,, | Performed by: STUDENT IN AN ORGANIZED HEALTH CARE EDUCATION/TRAINING PROGRAM

## 2021-11-10 PROCEDURE — 99213 OFFICE O/P EST LOW 20 MIN: CPT | Mod: 25,S$GLB,, | Performed by: STUDENT IN AN ORGANIZED HEALTH CARE EDUCATION/TRAINING PROGRAM

## 2021-11-10 PROCEDURE — 90714 TD VACC NO PRESV 7 YRS+ IM: CPT | Mod: S$GLB,,, | Performed by: STUDENT IN AN ORGANIZED HEALTH CARE EDUCATION/TRAINING PROGRAM

## 2021-11-10 PROCEDURE — 99999 PR PBB SHADOW E&M-EST. PATIENT-LVL IV: ICD-10-PCS | Mod: PBBFAC,,, | Performed by: STUDENT IN AN ORGANIZED HEALTH CARE EDUCATION/TRAINING PROGRAM

## 2021-11-10 PROCEDURE — 99999 PR PBB SHADOW E&M-EST. PATIENT-LVL IV: CPT | Mod: PBBFAC,,, | Performed by: STUDENT IN AN ORGANIZED HEALTH CARE EDUCATION/TRAINING PROGRAM

## 2021-11-10 PROCEDURE — 90686 FLU VACCINE (QUAD) GREATER THAN OR EQUAL TO 3YO PRESERVATIVE FREE IM: ICD-10-PCS | Mod: S$GLB,,, | Performed by: STUDENT IN AN ORGANIZED HEALTH CARE EDUCATION/TRAINING PROGRAM

## 2021-11-10 PROCEDURE — 90472 TD VACCINE GREATER THAN OR EQUAL TO 7YO PRESERVATIVE FREE IM: ICD-10-PCS | Mod: S$GLB,,, | Performed by: STUDENT IN AN ORGANIZED HEALTH CARE EDUCATION/TRAINING PROGRAM

## 2021-11-10 PROCEDURE — 90686 IIV4 VACC NO PRSV 0.5 ML IM: CPT | Mod: S$GLB,,, | Performed by: STUDENT IN AN ORGANIZED HEALTH CARE EDUCATION/TRAINING PROGRAM

## 2021-11-10 PROCEDURE — 90471 IMMUNIZATION ADMIN: CPT | Mod: S$GLB,,, | Performed by: STUDENT IN AN ORGANIZED HEALTH CARE EDUCATION/TRAINING PROGRAM

## 2021-11-10 PROCEDURE — 90472 IMMUNIZATION ADMIN EACH ADD: CPT | Mod: S$GLB,,, | Performed by: STUDENT IN AN ORGANIZED HEALTH CARE EDUCATION/TRAINING PROGRAM

## 2021-11-10 PROCEDURE — 93005 EKG 12-LEAD: ICD-10-PCS | Mod: S$GLB,,, | Performed by: STUDENT IN AN ORGANIZED HEALTH CARE EDUCATION/TRAINING PROGRAM

## 2021-11-10 PROCEDURE — 93010 EKG 12-LEAD: ICD-10-PCS | Mod: S$GLB,,, | Performed by: INTERNAL MEDICINE

## 2021-11-10 RX ORDER — METHYLPREDNISOLONE 4 MG/1
TABLET ORAL
Qty: 1 EACH | Refills: 0 | Status: SHIPPED | OUTPATIENT
Start: 2021-11-10 | End: 2022-02-03 | Stop reason: SDUPTHER

## 2021-11-10 RX ORDER — GABAPENTIN 300 MG/1
300 CAPSULE ORAL NIGHTLY
Qty: 30 CAPSULE | Refills: 1
Start: 2021-11-10 | End: 2023-08-18

## 2021-11-30 DIAGNOSIS — F32.A DEPRESSION, UNSPECIFIED DEPRESSION TYPE: ICD-10-CM

## 2021-11-30 RX ORDER — CITALOPRAM 20 MG/1
TABLET, FILM COATED ORAL
Qty: 90 TABLET | Refills: 3 | Status: SHIPPED | OUTPATIENT
Start: 2021-11-30 | End: 2022-09-14

## 2021-12-10 ENCOUNTER — IMMUNIZATION (OUTPATIENT)
Dept: PRIMARY CARE CLINIC | Facility: CLINIC | Age: 31
End: 2021-12-10
Payer: COMMERCIAL

## 2021-12-10 DIAGNOSIS — Z23 NEED FOR VACCINATION: Primary | ICD-10-CM

## 2021-12-10 PROCEDURE — 0004A COVID-19, MRNA, LNP-S, PF, 30 MCG/0.3 ML DOSE VACCINE: CPT | Mod: PBBFAC | Performed by: FAMILY MEDICINE

## 2021-12-31 ENCOUNTER — PATIENT MESSAGE (OUTPATIENT)
Dept: ADMINISTRATIVE | Facility: OTHER | Age: 31
End: 2021-12-31
Payer: COMMERCIAL

## 2022-01-14 ENCOUNTER — PATIENT MESSAGE (OUTPATIENT)
Dept: OBSTETRICS AND GYNECOLOGY | Facility: CLINIC | Age: 32
End: 2022-01-14
Payer: COMMERCIAL

## 2022-02-03 DIAGNOSIS — M94.0 COSTOCHONDRITIS: ICD-10-CM

## 2022-02-03 DIAGNOSIS — R07.89 MID STERNAL CHEST PAIN: ICD-10-CM

## 2022-02-04 DIAGNOSIS — R07.89 MID STERNAL CHEST PAIN: ICD-10-CM

## 2022-02-04 DIAGNOSIS — M94.0 COSTOCHONDRITIS: ICD-10-CM

## 2022-02-04 RX ORDER — METHYLPREDNISOLONE 4 MG/1
TABLET ORAL
Qty: 1 EACH | Refills: 0 | Status: SHIPPED | OUTPATIENT
Start: 2022-02-04 | End: 2022-05-30

## 2022-02-04 RX ORDER — METHYLPREDNISOLONE 4 MG/1
TABLET ORAL
Qty: 21 EACH | Refills: 0 | OUTPATIENT
Start: 2022-02-04

## 2022-02-04 NOTE — TELEPHONE ENCOUNTER
No new care gaps identified.  Powered by [employer]. Reference number: [id number].   2/03/2022 7:11:06 PM CST

## 2022-02-04 NOTE — TELEPHONE ENCOUNTER
No new care gaps identified.  Powered by [employer] Reference number: [id number].   2/04/2022 9:32:28 AM CST

## 2022-02-04 NOTE — TELEPHONE ENCOUNTER
----- Message from Soraya Vasques sent at 2/4/2022  9:47 AM CST -----  Contact: ALFRED FOLEY [[id number]] @ [phone]  Requesting an RX refill or new RX.  Is this a refill or new RX: Refill  RX name and strength (copy/paste from chart): MethylPREDNISolone (MEDROL DOSEPACK) 4 mg tablet  Is this a 30 day or 90 day RX: 30  Pharmacy name and phone # (copy/paste from chart): [facility]  Phone: [phone] Fax: [phone]  The doctors have asked that we provide their patients with the following 2 reminders -- prescription refills can take up to 72 hours, and a friendly reminder that in the future you can use your [facility] account to request refills:

## 2022-02-10 ENCOUNTER — TELEPHONE (OUTPATIENT)
Dept: PRIMARY CARE CLINIC | Facility: CLINIC | Age: 32
End: 2022-02-10
Payer: COMMERCIAL

## 2022-02-10 NOTE — TELEPHONE ENCOUNTER
----- Message from Manasa Aaron sent at 2/10/2022 12:24 PM CST -----  Contact: Patient, [phone]  Calling because she tested positive at the beginning of January, she still experiencing effects. Please advise. Thanks.

## 2022-02-10 NOTE — TELEPHONE ENCOUNTER
Patient states she is feeling pressure in her sternum.  This is something that has started recently.  She is asking if this could be from having Covid.    Please advise

## 2022-05-30 ENCOUNTER — OFFICE VISIT (OUTPATIENT)
Dept: PRIMARY CARE CLINIC | Facility: CLINIC | Age: 32
End: 2022-05-30
Payer: COMMERCIAL

## 2022-05-30 VITALS
TEMPERATURE: 98 F | HEART RATE: 94 BPM | HEIGHT: 62 IN | OXYGEN SATURATION: 98 % | DIASTOLIC BLOOD PRESSURE: 60 MMHG | WEIGHT: 131.31 LBS | RESPIRATION RATE: 16 BRPM | BODY MASS INDEX: 24.16 KG/M2 | SYSTOLIC BLOOD PRESSURE: 100 MMHG

## 2022-05-30 DIAGNOSIS — R05.9 COUGH: ICD-10-CM

## 2022-05-30 DIAGNOSIS — B30.9 VIRAL CONJUNCTIVITIS OF LEFT EYE: Primary | ICD-10-CM

## 2022-05-30 DIAGNOSIS — J02.9 SORE THROAT: ICD-10-CM

## 2022-05-30 DIAGNOSIS — H93.8X1 PRESSURE SENSATION IN RIGHT EAR: ICD-10-CM

## 2022-05-30 PROCEDURE — 99214 PR OFFICE/OUTPT VISIT, EST, LEVL IV, 30-39 MIN: ICD-10-PCS | Mod: S$GLB,,, | Performed by: STUDENT IN AN ORGANIZED HEALTH CARE EDUCATION/TRAINING PROGRAM

## 2022-05-30 PROCEDURE — 99999 PR PBB SHADOW E&M-EST. PATIENT-LVL IV: ICD-10-PCS | Mod: PBBFAC,,, | Performed by: STUDENT IN AN ORGANIZED HEALTH CARE EDUCATION/TRAINING PROGRAM

## 2022-05-30 PROCEDURE — 1159F PR MEDICATION LIST DOCUMENTED IN MEDICAL RECORD: ICD-10-PCS | Mod: CPTII,S$GLB,, | Performed by: STUDENT IN AN ORGANIZED HEALTH CARE EDUCATION/TRAINING PROGRAM

## 2022-05-30 PROCEDURE — 3074F SYST BP LT 130 MM HG: CPT | Mod: CPTII,S$GLB,, | Performed by: STUDENT IN AN ORGANIZED HEALTH CARE EDUCATION/TRAINING PROGRAM

## 2022-05-30 PROCEDURE — 3008F BODY MASS INDEX DOCD: CPT | Mod: CPTII,S$GLB,, | Performed by: STUDENT IN AN ORGANIZED HEALTH CARE EDUCATION/TRAINING PROGRAM

## 2022-05-30 PROCEDURE — 3078F DIAST BP <80 MM HG: CPT | Mod: CPTII,S$GLB,, | Performed by: STUDENT IN AN ORGANIZED HEALTH CARE EDUCATION/TRAINING PROGRAM

## 2022-05-30 PROCEDURE — 99999 PR PBB SHADOW E&M-EST. PATIENT-LVL IV: CPT | Mod: PBBFAC,,, | Performed by: STUDENT IN AN ORGANIZED HEALTH CARE EDUCATION/TRAINING PROGRAM

## 2022-05-30 PROCEDURE — 99214 OFFICE O/P EST MOD 30 MIN: CPT | Mod: S$GLB,,, | Performed by: STUDENT IN AN ORGANIZED HEALTH CARE EDUCATION/TRAINING PROGRAM

## 2022-05-30 PROCEDURE — 3078F PR MOST RECENT DIASTOLIC BLOOD PRESSURE < 80 MM HG: ICD-10-PCS | Mod: CPTII,S$GLB,, | Performed by: STUDENT IN AN ORGANIZED HEALTH CARE EDUCATION/TRAINING PROGRAM

## 2022-05-30 PROCEDURE — 1159F MED LIST DOCD IN RCRD: CPT | Mod: CPTII,S$GLB,, | Performed by: STUDENT IN AN ORGANIZED HEALTH CARE EDUCATION/TRAINING PROGRAM

## 2022-05-30 PROCEDURE — 3008F PR BODY MASS INDEX (BMI) DOCUMENTED: ICD-10-PCS | Mod: CPTII,S$GLB,, | Performed by: STUDENT IN AN ORGANIZED HEALTH CARE EDUCATION/TRAINING PROGRAM

## 2022-05-30 PROCEDURE — 3074F PR MOST RECENT SYSTOLIC BLOOD PRESSURE < 130 MM HG: ICD-10-PCS | Mod: CPTII,S$GLB,, | Performed by: STUDENT IN AN ORGANIZED HEALTH CARE EDUCATION/TRAINING PROGRAM

## 2022-05-30 RX ORDER — PREDNISONE 20 MG/1
TABLET ORAL
Qty: 5 TABLET | Refills: 0 | Status: SHIPPED | OUTPATIENT
Start: 2022-05-30 | End: 2022-11-19

## 2022-05-30 RX ORDER — OLOPATADINE HYDROCHLORIDE 1 MG/ML
1 SOLUTION/ DROPS OPHTHALMIC 2 TIMES DAILY
Qty: 5 ML | Refills: 0 | Status: SHIPPED | OUTPATIENT
Start: 2022-05-30 | End: 2023-08-18

## 2022-05-30 NOTE — PROGRESS NOTES
"Subjective:           Patient ID: Kiley Vyas is a 32 y.o. female who presents today with a chief complaint of URI and conjunctivitis.    Chief Complaint:   Nasal Congestion, Cough, and Conjunctivitis (Left eye)      History of Present Illness:    States that she woke Saturday in the AM congested with eye crusted over.    States she has been congested since then. Has been coughing and feeling like a cold.  Cough was not productive.     Has been having drainage on the left eye.  States the left eye it very itchy, but is not painful at all.     Patient states that she is not having thick mucoid exudate.  Has mostly just been a watery drainage.            Review of Systems   Constitutional: Negative for activity change, fatigue, fever and unexpected weight change.   HENT: Positive for rhinorrhea. Negative for congestion, nosebleeds, sinus pressure and sneezing.    Eyes: Positive for discharge and itching.   Respiratory: Positive for cough (non-productive). Negative for shortness of breath and wheezing.    Cardiovascular: Negative for chest pain, palpitations and leg swelling.   Gastrointestinal: Negative for abdominal distention, constipation, diarrhea and nausea.   Genitourinary: Negative for difficulty urinating and dysuria.   Musculoskeletal: Negative for back pain and gait problem.   Skin: Negative for pallor and rash.   Neurological: Negative for weakness, numbness and headaches.   Psychiatric/Behavioral: Negative for agitation. The patient is not nervous/anxious.            Objective:        Vitals:    05/30/22 1356   BP: 100/60   BP Location: Right arm   Patient Position: Sitting   BP Method: Medium (Manual)   Pulse: 94   Resp: 16   Temp: 98.3 °F (36.8 °C)   TempSrc: Oral   SpO2: 98%   Weight: 59.5 kg (131 lb 4.5 oz)   Height: 5' 2" (1.575 m)       Body mass index is 24.01 kg/m².      Physical Exam  Vitals reviewed.   Constitutional:       General: She is not in acute distress.     Appearance: Normal " appearance.      Comments: Cough during exam.   HENT:      Head: Normocephalic and atraumatic.      Right Ear: External ear normal.      Left Ear: Tympanic membrane and external ear normal.      Ears:      Comments: Increased fluid in the right ear     Nose: No congestion or rhinorrhea.      Mouth/Throat:      Mouth: Mucous membranes are moist.      Pharynx: No posterior oropharyngeal erythema.   Eyes:      Extraocular Movements: Extraocular movements intact.      Conjunctiva/sclera:      Right eye: Right conjunctiva is not injected. No exudate or hemorrhage.     Left eye: Left conjunctiva is injected. No exudate or hemorrhage.       Comments: Left eye with scleral injection and water discharge.     Cardiovascular:      Rate and Rhythm: Normal rate.   Pulmonary:      Effort: Pulmonary effort is normal. No respiratory distress.   Musculoskeletal:      Right lower leg: No edema.      Left lower leg: No edema.   Skin:     Coloration: Skin is not jaundiced.      Findings: No bruising.   Neurological:      General: No focal deficit present.      Mental Status: She is alert and oriented to person, place, and time.      Motor: No weakness.      Gait: Gait normal.   Psychiatric:         Mood and Affect: Mood normal.             Lab Results   Component Value Date     11/10/2021    K 4.4 11/10/2021     11/10/2021    CO2 22 (L) 11/10/2021    BUN 9 11/10/2021    CREATININE 0.8 11/10/2021    ANIONGAP 10 11/10/2021     No results found for: HGBA1C  No results found for: BNP, BNPTRIAGEBLO    Lab Results   Component Value Date    WBC 7.41 11/10/2021    HGB 13.7 11/10/2021    HCT 42.7 11/10/2021     11/10/2021    GRAN 4.2 11/10/2021    GRAN 56.1 11/10/2021     Lab Results   Component Value Date    CHOL 194 11/10/2021    HDL 61 11/10/2021    LDLCALC 115.8 11/10/2021    TRIG 86 11/10/2021          Current Outpatient Medications:     citalopram (CELEXA) 20 MG tablet, TAKE 1 TABLET(20 MG) BY MOUTH EVERY DAY, Disp: 90  tablet, Rfl: 3    gabapentin (NEURONTIN) 300 MG capsule, Take 1 capsule (300 mg total) by mouth every evening., Disp: 30 capsule, Rfl: 1    olopatadine (PATANOL) 0.1 % ophthalmic solution, Place 1 drop into the left eye 2 (two) times daily., Disp: 5 mL, Rfl: 0    predniSONE (DELTASONE) 20 MG tablet, One tab daily., Disp: 5 tablet, Rfl: 0     Outpatient Encounter Medications as of 5/30/2022   Medication Sig Dispense Refill    citalopram (CELEXA) 20 MG tablet TAKE 1 TABLET(20 MG) BY MOUTH EVERY DAY 90 tablet 3    gabapentin (NEURONTIN) 300 MG capsule Take 1 capsule (300 mg total) by mouth every evening. 30 capsule 1    olopatadine (PATANOL) 0.1 % ophthalmic solution Place 1 drop into the left eye 2 (two) times daily. 5 mL 0    predniSONE (DELTASONE) 20 MG tablet One tab daily. 5 tablet 0    [DISCONTINUED] methylPREDNISolone (MEDROL DOSEPACK) 4 mg tablet use as directed 1 each 0     No facility-administered encounter medications on file as of 5/30/2022.          Assessment:       1. Viral conjunctivitis of left eye    2. Pressure sensation in right ear    3. Sore throat    4. Cough           Plan:       Viral conjunctivitis of left eye  -     olopatadine (PATANOL) 0.1 % ophthalmic solution; Place 1 drop into the left eye 2 (two) times daily.  Dispense: 5 mL; Refill: 0    Pressure sensation in right ear  -     predniSONE (DELTASONE) 20 MG tablet; One tab daily.  Dispense: 5 tablet; Refill: 0    Sore throat  -     predniSONE (DELTASONE) 20 MG tablet; One tab daily.  Dispense: 5 tablet; Refill: 0    Cough  -     predniSONE (DELTASONE) 20 MG tablet; One tab daily.  Dispense: 5 tablet; Refill: 0      Viral Conjunctivitis, left eye:    - Warm soaks to keep lids and lashes free of debris   - Cool compresses may be soothing   - Practice good hygiene to prevent contagious spread   - Topical lubricant (Methylcellulose) or artificial tears   - will give an antihistamine drop to control eye itching and drainage. 2 times  daily until s/s are resolved.   - if getting increased mucoid drainage, call clinic and would start on an antibiotic drop.      Left Ear pressure, chronic cough:   - starting on Prednisone 20mg daily for 5 days due to pressure behind the right ear drum and to sore throat with cough.

## 2022-05-30 NOTE — PATIENT INSTRUCTIONS
Viral Conjunctivitis, left eye:    - Warm soaks to keep lids and lashes free of debris   - Cool compresses may be soothing   - Practice good hygiene to prevent contagious spread   - Topical lubricant (Methylcellulose) or artificial tears   - will give an antihistamine drop to control eye itching and drainage. 2 times daily until s/s are resolved.   - if getting increased mucoid drainage, call clinic and would start on an antibiotic drop.      Left Ear pressure, chronic cough:   - starting on Prednisone 20mg daily for 5 days due to pressure behind the right ear drum and to sore throat with cough.

## 2022-05-31 ENCOUNTER — TELEPHONE (OUTPATIENT)
Dept: PRIMARY CARE CLINIC | Facility: CLINIC | Age: 32
End: 2022-05-31
Payer: COMMERCIAL

## 2022-05-31 NOTE — TELEPHONE ENCOUNTER
----- Message from Brina Morin sent at 5/31/2022 12:03 PM CDT -----  Contact: [phone]  Pt is asking if she can take mucinex with the steroid she was given yesterday please advise and give return call

## 2022-06-03 ENCOUNTER — TELEPHONE (OUTPATIENT)
Dept: PRIMARY CARE CLINIC | Facility: CLINIC | Age: 32
End: 2022-06-03
Payer: COMMERCIAL

## 2022-06-03 DIAGNOSIS — J06.9 UPPER RESPIRATORY TRACT INFECTION, UNSPECIFIED TYPE: Primary | ICD-10-CM

## 2022-06-03 NOTE — TELEPHONE ENCOUNTER
----- Message from Manasa Aaron sent at 6/3/2022 10:27 AM CDT -----  Contact: Patient, [phone]  Calling to speak with the nurse regarding se was seen Monday and is still coughing a lot. Please advise. Thanks.

## 2022-06-03 NOTE — TELEPHONE ENCOUNTER
Patient states she is still stuffy,ears blocked and bad cough.  She has tried Mucinex and Robitussin w/ honey , but they are not working.  She is unable to sleep at night due to cough.    Please advise    [address]

## 2022-06-04 ENCOUNTER — PATIENT MESSAGE (OUTPATIENT)
Dept: PRIMARY CARE CLINIC | Facility: CLINIC | Age: 32
End: 2022-06-04
Payer: COMMERCIAL

## 2022-06-04 RX ORDER — PROMETHAZINE HYDROCHLORIDE AND CODEINE PHOSPHATE 6.25; 1 MG/5ML; MG/5ML
5 SOLUTION ORAL EVERY 6 HOURS PRN
Qty: 90 ML | Refills: 0 | Status: SHIPPED | OUTPATIENT
Start: 2022-06-04 | End: 2023-08-18

## 2022-06-04 RX ORDER — AZITHROMYCIN 250 MG/1
TABLET, FILM COATED ORAL
Qty: 6 TABLET | Refills: 0 | Status: SHIPPED | OUTPATIENT
Start: 2022-06-04 | End: 2022-06-09

## 2022-06-05 NOTE — TELEPHONE ENCOUNTER
Sorry to hear that you are still not feeling well.  It sounds like your symptoms have evolved a bit as I recall having eye issues being the major issue at that time, with some associated congestion and cough. Not it seems that the cough and congestion have become the major issue.     We can try a cough syrup with codeine to help control the cough better.      I'll also send a Zpak since I'm out all next week, but if you feel this is getting any better, I would not suggest starting on an antibiotic.     I'm headed to [facility] for the week, so won't be able to give further advise this week, hopefully this is more effective for you than the steroid.

## 2022-06-08 ENCOUNTER — TELEPHONE (OUTPATIENT)
Dept: PRIMARY CARE CLINIC | Facility: CLINIC | Age: 32
End: 2022-06-08
Payer: COMMERCIAL

## 2022-06-08 NOTE — TELEPHONE ENCOUNTER
Patient called stating she has a lump in her armpit.  She went to her mother's oncologist and was told to f/u with her PCP for an ultrasound.    She states she will schedule a virtual appointment with Dr. Beck next week as he is booked solid for in patient appointments.

## 2022-06-08 NOTE — TELEPHONE ENCOUNTER
----- Message from Kathy Anderson sent at 6/8/2022  2:58 PM CDT -----  Regarding: advice  Contact: patient [phone]  [email or web address]     Patient is calling for Medical Advice regarding:lump on underside of under arm    How long has patient had these symptoms: 3 days    Pharmacy name and phone#:    Would like response via [employer]:  please call    Comments:

## 2022-06-13 ENCOUNTER — OFFICE VISIT (OUTPATIENT)
Dept: PRIMARY CARE CLINIC | Facility: CLINIC | Age: 32
End: 2022-06-13
Payer: COMMERCIAL

## 2022-06-13 DIAGNOSIS — R22.31 MASS OF RIGHT AXILLA: Primary | ICD-10-CM

## 2022-06-13 PROCEDURE — 1160F RVW MEDS BY RX/DR IN RCRD: CPT | Mod: CPTII,95,, | Performed by: STUDENT IN AN ORGANIZED HEALTH CARE EDUCATION/TRAINING PROGRAM

## 2022-06-13 PROCEDURE — 99214 PR OFFICE/OUTPT VISIT, EST, LEVL IV, 30-39 MIN: ICD-10-PCS | Mod: 95,,, | Performed by: STUDENT IN AN ORGANIZED HEALTH CARE EDUCATION/TRAINING PROGRAM

## 2022-06-13 PROCEDURE — 1159F MED LIST DOCD IN RCRD: CPT | Mod: CPTII,95,, | Performed by: STUDENT IN AN ORGANIZED HEALTH CARE EDUCATION/TRAINING PROGRAM

## 2022-06-13 PROCEDURE — 1160F PR REVIEW ALL MEDS BY PRESCRIBER/CLIN PHARMACIST DOCUMENTED: ICD-10-PCS | Mod: CPTII,95,, | Performed by: STUDENT IN AN ORGANIZED HEALTH CARE EDUCATION/TRAINING PROGRAM

## 2022-06-13 PROCEDURE — 1159F PR MEDICATION LIST DOCUMENTED IN MEDICAL RECORD: ICD-10-PCS | Mod: CPTII,95,, | Performed by: STUDENT IN AN ORGANIZED HEALTH CARE EDUCATION/TRAINING PROGRAM

## 2022-06-13 PROCEDURE — 99214 OFFICE O/P EST MOD 30 MIN: CPT | Mod: 95,,, | Performed by: STUDENT IN AN ORGANIZED HEALTH CARE EDUCATION/TRAINING PROGRAM

## 2022-06-13 NOTE — PROGRESS NOTES
The patient location is: [place]   The chief complaint leading to consultation is: Right axillary lump    Visit type: audiovisual    Face to Face time with patient: 20  25 minutes of total time spent on the encounter, which includes face to face time and non-face to face time preparing to see the patient (eg, review of tests), Obtaining and/or reviewing separately obtained history, Documenting clinical information in the electronic or other health record, Independently interpreting results (not separately reported) and communicating results to the patient/family/caregiver, or Care coordination (not separately reported).         Each patient to whom he or she provides medical services by telemedicine is:  (1) informed of the relationship between the physician and patient and the respective role of any other health care provider with respect to management of the patient; and (2) notified that he or she may decline to receive medical services by telemedicine and may withdraw from such care at any time.    Notes:     33yo female with right axillary lesion, first noticed in shower last weekend (8-9 days ago).    States has raised portion that is TTP.  Can be painful with clothing pressing it or with certain movements when swimming as a .    Will be sending a picture of lesion.    Saw her mother's oncologist at [facility] and was advised it was unlikely to be cancer as is not over lymphnodes, was likely a lipoma but advised getting it checked.    Physical Exam  Constitutional:       General: She is not in acute distress.     Appearance: Normal appearance.   HENT:      Right Ear: External ear normal.      Left Ear: External ear normal.      Nose: No rhinorrhea.   Eyes:      Extraocular Movements: Extraocular movements intact.   Pulmonary:      Effort: Pulmonary effort is normal. No respiratory distress.   Skin:     Comments: Pale white region in right axilla, hard to determine size or elevation over video.    Neurological:      Mental Status: She is alert and oriented to person, place, and time.   Psychiatric:         Mood and Affect: Mood normal.         Behavior: Behavior normal.         Right Axillar Lesion:   - reportedly present for 8-9 days.  Appeared abruptly, not noted in recnet weeks, then noticed in showing one night.   - was having pain to this area with cough from URI recently.   - TTP.  Was seen by outside Oncologist who reassured patient was likely not cancer, but should get checked.   - ordering US of right axilla as well as CBC.   - no night sweats, no fevers/chills.  Does have cough.

## 2022-06-17 ENCOUNTER — TELEPHONE (OUTPATIENT)
Dept: PRIMARY CARE CLINIC | Facility: CLINIC | Age: 32
End: 2022-06-17
Payer: COMMERCIAL

## 2022-06-17 NOTE — TELEPHONE ENCOUNTER
----- Message from Haydee Quintero sent at 6/17/2022 12:43 PM CDT -----  Contact: Pt Mobile [phone]  Patient is calling in regards to her saying that you were suppose to put in an order for her to have an ultrasound for under her right arm but the orders was not put in.     Patient would like for you to put the orders in please.

## 2022-06-17 NOTE — TELEPHONE ENCOUNTER
----- Message from Haydee Quintero sent at 6/17/2022 12:43 PM CDT -----  Contact: Pt Mobile [phone]  Patient is calling in regards to her saying that you were suppose to put in an order for her to have an ultrasound for under her right arm but the orders was not put in.     Patient would like for you to put the orders in please.

## 2022-06-27 ENCOUNTER — TELEPHONE (OUTPATIENT)
Dept: RADIOLOGY | Facility: HOSPITAL | Age: 32
End: 2022-06-27
Payer: COMMERCIAL

## 2022-06-27 NOTE — TELEPHONE ENCOUNTER
Returned patients Mom's call per phone message in reference to scheduling a ultrasound, no answer. Left message with my contact information.

## 2022-06-27 NOTE — TELEPHONE ENCOUNTER
----- Message from Yvette Sidhu MA sent at 6/27/2022  8:24 AM CDT -----  Regarding: FW: resched appt from today with you  Hey!    This patient was scheduled in [place] for an axilla ultrasound and it looks like they don't do that there. The patient was real upset. Her doctor only ordered the u/s, no mmg. Do we do this here? Or across the street? What are your thoughts?    Thanks,  Yvette    ----- Message -----  From: Oswaldo Jewell  Sent: 6/27/2022   8:06 AM CDT  To: , #  Subject: resched appt from today with you                     The caller states that she rcvd a call from the location the Pt was supposed to have her imaging this morning and told them they need to do it at [facility].      Please contact the caller to resched.    # [phone] (Vikki)

## 2022-06-28 ENCOUNTER — HOSPITAL ENCOUNTER (OUTPATIENT)
Dept: RADIOLOGY | Facility: HOSPITAL | Age: 32
Discharge: HOME | End: 2022-06-28
Attending: STUDENT IN AN ORGANIZED HEALTH CARE EDUCATION/TRAINING PROGRAM
Payer: COMMERCIAL

## 2022-06-28 PROCEDURE — 76882 US LMTD JT/FCL EVL NVASC XTR: CPT | Mod: TC,LT

## 2022-06-28 PROCEDURE — 76882 US SOFT TISSUE AXILLA, LEFT: ICD-10-PCS | Mod: 26,LT,, | Performed by: RADIOLOGY

## 2022-06-28 PROCEDURE — 76882 US LMTD JT/FCL EVL NVASC XTR: CPT | Mod: 26,LT,, | Performed by: RADIOLOGY

## 2022-08-08 ENCOUNTER — OFFICE VISIT (OUTPATIENT)
Dept: OBSTETRICS AND GYNECOLOGY | Facility: CLINIC | Age: 32
End: 2022-08-08
Payer: COMMERCIAL

## 2022-08-08 VITALS
WEIGHT: 129.19 LBS | HEIGHT: 62 IN | HEART RATE: 59 BPM | DIASTOLIC BLOOD PRESSURE: 66 MMHG | BODY MASS INDEX: 23.77 KG/M2 | SYSTOLIC BLOOD PRESSURE: 109 MMHG

## 2022-08-08 DIAGNOSIS — Z01.419 ENCOUNTER FOR ANNUAL ROUTINE GYNECOLOGICAL EXAMINATION: Primary | ICD-10-CM

## 2022-08-08 PROCEDURE — 3074F PR MOST RECENT SYSTOLIC BLOOD PRESSURE < 130 MM HG: ICD-10-PCS | Mod: CPTII,S$GLB,, | Performed by: OBSTETRICS & GYNECOLOGY

## 2022-08-08 PROCEDURE — 1159F PR MEDICATION LIST DOCUMENTED IN MEDICAL RECORD: ICD-10-PCS | Mod: CPTII,S$GLB,, | Performed by: OBSTETRICS & GYNECOLOGY

## 2022-08-08 PROCEDURE — 99395 PREV VISIT EST AGE 18-39: CPT | Mod: S$GLB,,, | Performed by: OBSTETRICS & GYNECOLOGY

## 2022-08-08 PROCEDURE — 99999 PR PBB SHADOW E&M-EST. PATIENT-LVL III: CPT | Mod: PBBFAC,,, | Performed by: OBSTETRICS & GYNECOLOGY

## 2022-08-08 PROCEDURE — 3078F PR MOST RECENT DIASTOLIC BLOOD PRESSURE < 80 MM HG: ICD-10-PCS | Mod: CPTII,S$GLB,, | Performed by: OBSTETRICS & GYNECOLOGY

## 2022-08-08 PROCEDURE — 99395 PR PREVENTIVE VISIT,EST,18-39: ICD-10-PCS | Mod: S$GLB,,, | Performed by: OBSTETRICS & GYNECOLOGY

## 2022-08-08 PROCEDURE — 3078F DIAST BP <80 MM HG: CPT | Mod: CPTII,S$GLB,, | Performed by: OBSTETRICS & GYNECOLOGY

## 2022-08-08 PROCEDURE — 3074F SYST BP LT 130 MM HG: CPT | Mod: CPTII,S$GLB,, | Performed by: OBSTETRICS & GYNECOLOGY

## 2022-08-08 PROCEDURE — 1159F MED LIST DOCD IN RCRD: CPT | Mod: CPTII,S$GLB,, | Performed by: OBSTETRICS & GYNECOLOGY

## 2022-08-08 PROCEDURE — 1160F PR REVIEW ALL MEDS BY PRESCRIBER/CLIN PHARMACIST DOCUMENTED: ICD-10-PCS | Mod: CPTII,S$GLB,, | Performed by: OBSTETRICS & GYNECOLOGY

## 2022-08-08 PROCEDURE — 3008F PR BODY MASS INDEX (BMI) DOCUMENTED: ICD-10-PCS | Mod: CPTII,S$GLB,, | Performed by: OBSTETRICS & GYNECOLOGY

## 2022-08-08 PROCEDURE — 99999 PR PBB SHADOW E&M-EST. PATIENT-LVL III: ICD-10-PCS | Mod: PBBFAC,,, | Performed by: OBSTETRICS & GYNECOLOGY

## 2022-08-08 PROCEDURE — 3008F BODY MASS INDEX DOCD: CPT | Mod: CPTII,S$GLB,, | Performed by: OBSTETRICS & GYNECOLOGY

## 2022-08-08 PROCEDURE — 1160F RVW MEDS BY RX/DR IN RCRD: CPT | Mod: CPTII,S$GLB,, | Performed by: OBSTETRICS & GYNECOLOGY

## 2022-08-08 NOTE — PROGRESS NOTES
SUBJECTIVE:     Chief Complaint: Well Woman       History of Present Illness:  Annual Exam  Patient presents for annual exam.   She c/o nothing today.  LMP: 22, regular, last 3 days  She denies any vd, vb, dyspareunia, dysuria, depression, anxiety.  Last pap was in  and was neg. HPV neg.  Birth Control: none, declines  declines STD testing.   Not sexually active.     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    Seborrheic dermatitis     Strabismus      Past Surgical History:   Procedure Laterality Date    COLONOSCOPY  2017    STRABISMUS SURGERY  1994    Denervation and extirpation of IO ou    STRABISMUS SURGERY  2010    Recession LR OD 6mm, Resection MR OD 5mm     OB History        0    Para   0    Term   0       0    AB   0    Living   0       SAB   0    IAB   0    Ectopic   0    Multiple   0    Live Births   0               Family History   Problem Relation Age of Onset    Colon cancer Mother 51    Diabetes Paternal Grandfather     Cataracts Maternal Grandmother     Melanoma Father     Hypertension Father     Breast cancer Neg Hx     Ovarian cancer Neg Hx     Lung cancer Neg Hx      Social History     Tobacco Use    Smoking status: Never Smoker    Smokeless tobacco: Never Used   Substance Use Topics    Alcohol use: Yes     Comment: social, 1 drink weekly    Drug use: No       Current Outpatient Medications   Medication Sig    citalopram (CELEXA) 20 MG tablet TAKE 1 TABLET(20 MG) BY MOUTH EVERY DAY    gabapentin (NEURONTIN) 300 MG capsule Take 1 capsule (300 mg total) by mouth every evening.    olopatadine (PATANOL) 0.1 % ophthalmic solution Place 1 drop into the left eye 2 (two) times  daily.    predniSONE (DELTASONE) 20 MG tablet One tab daily.    promethazine-codeine 6.25-10 mg/5 ml (PHENERGAN WITH CODEINE) 6.25-10 mg/5 mL syrup Take 5 mLs by mouth every 6 (six) hours as needed for Cough.     No current facility-administered medications for this visit.       Review of Systems:  GENERAL: No fever, chills, fatigability or weight loss.  CARDIOVASCULAR: No chest pain. No palpitations.  RESPIRATORY: No SOB, no wheezing.  BREAST: Denies pain. No lumps. No discharge.  VULVAR: No pain, no lesions and no itching.  VAGINAL: No relaxation, no itching, no discharge, no abnormal bleeding and no lesions.  ABDOMEN: No abdominal pain. Denies nausea. Denies vomiting. No diarrhea. No constipation  URINARY: No incontinence, no nocturia, no frequency and no dysuria.  NEUROLOGICAL: No headaches. No vision changes.       OBJECTIVE:     Vitals:    08/08/22 1331   BP: 109/66   Pulse: (!) 59       Patient verbally consents to pelvic and breast exams.  Physical Exam:  Gen: NAD, well developed, well-nourished  HEENT: Normocephalic, atraumatic  Eyes: EOM nl, conjuntivae normal  Neck: ROM normal, no thyromegaly  Respiratory: Effort normal   Abd: soft, nontender, no masses palpated  Breast: Normal bilaterally, no masses, lesions or tenderness. No nipple discharge on expression, no lymphadenopathy bilaterally.  SSE:  Vulva: no lesions or rashes  Cervix: No lesions noted, nonfriable, no vaginal discharge or vaginal bleeding noted  BME:   Cervix: No CMT  Adnexa: nl bilaterally, no masses or fullness palpated  Uterus: normal, nonenlarged  Musculoskeletal: normal ROM  Neuro: alert, AAOx3  Skin: warm and dry  Psych: mood/affect nl, behavior normal, judgement normal, thought content normal        ASSESSMENT:       ICD-10-CM ICD-9-CM    1. Encounter for annual routine gynecological examination  Z01.419 V72.31           Plan:      Kiley was seen today for well woman.    Diagnoses and all orders for this visit:    Encounter for  annual routine gynecological examination        No orders of the defined types were placed in this encounter.      Follow up in one year for annual, or prn.    Julie R Jeansonne

## 2022-09-14 DIAGNOSIS — F32.A DEPRESSION, UNSPECIFIED DEPRESSION TYPE: ICD-10-CM

## 2022-09-14 RX ORDER — CITALOPRAM 20 MG/1
TABLET, FILM COATED ORAL
Qty: 90 TABLET | Refills: 3 | Status: SHIPPED | OUTPATIENT
Start: 2022-09-14 | End: 2023-09-14

## 2022-09-14 NOTE — TELEPHONE ENCOUNTER
No new care gaps identified.  [facility] Embedded Care Gaps. Reference number: [id number]. 9/14/2022   8:02:11 AM LOVET

## 2022-09-14 NOTE — TELEPHONE ENCOUNTER
Refill Decision Note   Kiley Eddynt  is requesting a refill authorization.  Brief Assessment and Rationale for Refill:  Approve     Medication Therapy Plan:       Medication Reconciliation Completed: No   Comments:     No Care Gaps recommended.     Note composed:9:22 AM 09/14/2022

## 2022-09-27 ENCOUNTER — PATIENT MESSAGE (OUTPATIENT)
Dept: PRIMARY CARE CLINIC | Facility: CLINIC | Age: 32
End: 2022-09-27
Payer: COMMERCIAL

## 2022-11-19 ENCOUNTER — PATIENT MESSAGE (OUTPATIENT)
Dept: PRIMARY CARE CLINIC | Facility: CLINIC | Age: 32
End: 2022-11-19
Payer: COMMERCIAL

## 2022-11-19 DIAGNOSIS — R07.89 MID STERNAL CHEST PAIN: ICD-10-CM

## 2022-11-19 DIAGNOSIS — M94.0 COSTOCHONDRITIS: Primary | ICD-10-CM

## 2022-11-19 RX ORDER — METHYLPREDNISOLONE 4 MG/1
TABLET ORAL
Qty: 21 EACH | Refills: 0 | Status: SHIPPED | OUTPATIENT
Start: 2022-11-19 | End: 2022-12-10

## 2022-11-20 NOTE — TELEPHONE ENCOUNTER
Called and spoke with patient.     Kiley reports having 2 days of sternal and chest pressure, worse with tight clothing over the chest.    Not affected by respiration.  Not worse with exertion.  Not related to diet.      States she had gotten what she calls costochondritis about once a year.  Last time used a medrol dose pack, it resolved and has not recurred for 12 months.     Advised her we would try that same treatment again, but if it goes not go away or it she gets it again soon; we should have an appt to look at it closer.     Does not sound to be respiratory or anginal.     Spoke to patient about his PCP on this call as well (it was changed yesterday for unclear reason) and see stated that she views me as her PCP and would follow with me for chronic care issues - to this field was changed back to myself.    - Dr. Beck

## 2023-06-15 ENCOUNTER — OFFICE VISIT (OUTPATIENT)
Dept: OPTOMETRY | Facility: CLINIC | Age: 33
End: 2023-06-15
Payer: COMMERCIAL

## 2023-06-15 DIAGNOSIS — H52.13 MYOPIC ASTIGMATISM OF BOTH EYES: Primary | ICD-10-CM

## 2023-06-15 DIAGNOSIS — H52.203 MYOPIC ASTIGMATISM OF BOTH EYES: Primary | ICD-10-CM

## 2023-06-15 PROCEDURE — 92015 DETERMINE REFRACTIVE STATE: CPT | Mod: S$GLB,,, | Performed by: OPTOMETRIST

## 2023-06-15 PROCEDURE — 92014 PR EYE EXAM, EST PATIENT,COMPREHESV: ICD-10-PCS | Mod: S$GLB,,, | Performed by: OPTOMETRIST

## 2023-06-15 PROCEDURE — 92015 PR REFRACTION: ICD-10-PCS | Mod: S$GLB,,, | Performed by: OPTOMETRIST

## 2023-06-15 PROCEDURE — 92014 COMPRE OPH EXAM EST PT 1/>: CPT | Mod: S$GLB,,, | Performed by: OPTOMETRIST

## 2023-06-15 PROCEDURE — 99999 PR PBB SHADOW E&M-EST. PATIENT-LVL III: CPT | Mod: PBBFAC,,, | Performed by: OPTOMETRIST

## 2023-06-15 PROCEDURE — 99999 PR PBB SHADOW E&M-EST. PATIENT-LVL III: ICD-10-PCS | Mod: PBBFAC,,, | Performed by: OPTOMETRIST

## 2023-06-15 NOTE — PROGRESS NOTES
Outpatient Therapy Discharge Summary     Name: Mariusz Mota  Clinic Number: [id number]    Therapy Diagnosis:        Encounter Diagnosis   Name Primary?    Cervical radiculopathy       Physician: Bobo Stein MD    Physician Orders: PT Eval and Treat neck  Medical Diagnosis from Referral: M54.12 (ICD-10-CM) - Cervical radiculopathy  Evaluation Date: 6/8/2020    Date of Last visit: 06/08/2020  Total Visits Received: 1  Cancelled Visits: 0  No Show Visits: 0    Assessment    Goals:   eval only attended    Discharge reason: Other:  eval only attended. Pt discharged.    Plan   This patient is discharged from Physical Therapy.           HPI    32 Y/o female is here for routine eye exam with no C/o about ocular health     Pt denies pain and discomfort   No f/f    Eye med: no gtt   Last edited by Grabiel Dasilva MA on 6/15/2023 12:54 PM.            Assessment /Plan     For exam results, see Encounter Report.    Myopic astigmatism of both eyes        1. Residual AXT sp EOM surgery X 3 (see Dr Del Valle notes).  Pt monofixates, uses OD for near, and OS for dist.  Has PAL spex lens OS ONLY which subjectively helps OS see near better.  Wishes new Rx  2. 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 Wrote spex Rx: dist OU w PAL OS only  2. rtc 1 yr

## 2023-06-21 ENCOUNTER — PATIENT MESSAGE (OUTPATIENT)
Dept: DERMATOLOGY | Facility: CLINIC | Age: 33
End: 2023-06-21
Payer: COMMERCIAL

## 2023-07-10 ENCOUNTER — TELEPHONE (OUTPATIENT)
Dept: DERMATOLOGY | Facility: CLINIC | Age: 33
End: 2023-07-10
Payer: COMMERCIAL

## 2023-07-10 NOTE — TELEPHONE ENCOUNTER
----- Message from Sasha Goetz sent at 7/10/2023 11:04 AM CDT -----  Type:  Sooner Appointment Request    Caller is requesting a sooner appointment.  Caller declined first available appointment listed below.  Caller will not accept being placed on the waitlist and is requesting a message be sent to doctor.    Name of Caller:  pt father--Jordan  When is the first available appointment?  3/24/24--told pt I could schedule and put him on the waiting list--said he need the nurse to call him--please cll and advise  Symptoms:  eczema   Best Call Back Number:  [phone] (home)     Additional Information:  thank you

## 2023-07-10 NOTE — TELEPHONE ENCOUNTER
Pt contacted and offered first available new pt appt with Dr Jarquin and she declined stating that she is seeing another dermatologist on 7/17/23.

## 2023-07-11 ENCOUNTER — TELEPHONE (OUTPATIENT)
Dept: DERMATOLOGY | Facility: CLINIC | Age: 33
End: 2023-07-11
Payer: COMMERCIAL

## 2023-07-11 NOTE — TELEPHONE ENCOUNTER
----- Message from Trisha Aguiar MA sent at 7/10/2023  2:31 PM CDT -----  Regarding: FW: rescheduled appt  Contact: [phone]    ----- Message -----  From: Isha Strickland  Sent: 7/10/2023  12:42 PM CDT  To: Missael Valladares Staff  Subject: rescheduled appt                                 Dad is calling to get appt scheduled to [facility]. Asking for a call back

## 2023-07-17 ENCOUNTER — OFFICE VISIT (OUTPATIENT)
Dept: DERMATOLOGY | Facility: CLINIC | Age: 33
End: 2023-07-17
Payer: COMMERCIAL

## 2023-07-17 DIAGNOSIS — L40.8 OTHER PSORIASIS: ICD-10-CM

## 2023-07-17 DIAGNOSIS — L40.9 SCALP PSORIASIS: Primary | ICD-10-CM

## 2023-07-17 PROCEDURE — 1160F RVW MEDS BY RX/DR IN RCRD: CPT | Mod: CPTII,S$GLB,, | Performed by: DERMATOLOGY

## 2023-07-17 PROCEDURE — 99203 OFFICE O/P NEW LOW 30 MIN: CPT | Mod: S$GLB,,, | Performed by: DERMATOLOGY

## 2023-07-17 PROCEDURE — 99999 PR PBB SHADOW E&M-EST. PATIENT-LVL II: CPT | Mod: PBBFAC,,, | Performed by: DERMATOLOGY

## 2023-07-17 PROCEDURE — 99999 PR PBB SHADOW E&M-EST. PATIENT-LVL II: ICD-10-PCS | Mod: PBBFAC,,, | Performed by: DERMATOLOGY

## 2023-07-17 PROCEDURE — 1159F MED LIST DOCD IN RCRD: CPT | Mod: CPTII,S$GLB,, | Performed by: DERMATOLOGY

## 2023-07-17 PROCEDURE — 99203 PR OFFICE/OUTPT VISIT, NEW, LEVL III, 30-44 MIN: ICD-10-PCS | Mod: S$GLB,,, | Performed by: DERMATOLOGY

## 2023-07-17 PROCEDURE — 1160F PR REVIEW ALL MEDS BY PRESCRIBER/CLIN PHARMACIST DOCUMENTED: ICD-10-PCS | Mod: CPTII,S$GLB,, | Performed by: DERMATOLOGY

## 2023-07-17 PROCEDURE — 1159F PR MEDICATION LIST DOCUMENTED IN MEDICAL RECORD: ICD-10-PCS | Mod: CPTII,S$GLB,, | Performed by: DERMATOLOGY

## 2023-07-17 RX ORDER — CLOBETASOL PROPIONATE 0.05 G/100ML
SHAMPOO TOPICAL
Qty: 118 ML | Refills: 5 | Status: SHIPPED | OUTPATIENT
Start: 2023-07-17

## 2023-07-17 RX ORDER — CLOBETASOL PROPIONATE 0.5 MG/G
AEROSOL, FOAM TOPICAL
Qty: 50 G | Refills: 3 | Status: SHIPPED | OUTPATIENT
Start: 2023-07-17

## 2023-07-17 NOTE — PROGRESS NOTES
Subjective:      Patient ID:  Kiley Vyas is a 33 y.o. female who presents for No chief complaint on file.    History of Present Illness: The patient presents with chief complaint of psoriasis. Previously seen by Dr. Shultz, Dr. Canas, and PA Field.  Location: right scalp, left ear   Duration: years  Signs/Symptoms: flares up every once a while, currently flared up for a week, itching, discomfort     Prior treatments: clobetasol solution didn't help for long, fluocinolone oil, keto shampoo, TAC oint, ILK, betamethasone cream      Review of Systems   Constitutional:  Negative for fever and chills.   Gastrointestinal:  Negative for nausea and vomiting.   Skin:  Positive for itching, rash, dry skin and activity-related sunscreen use. Negative for daily sunscreen use and recent sunburn.   Hematologic/Lymphatic: Does not bruise/bleed easily.     Objective:   Physical Exam   Constitutional: She appears well-developed and well-nourished. No distress.   Neurological: She is alert and oriented to person, place, and time. She is not disoriented.   Psychiatric: She has a normal mood and affect.   Skin:   Areas Examined (abnormalities noted in diagram):   Scalp / Hair Palpated and Inspected  Head / Face Inspection Performed  Neck Inspection Performed  RUE Inspected  LUE Inspection Performed  Nails and Digits Inspection Performed            Assessment / Plan:        Scalp psoriasis  Other psoriasis  -     clobetasoL (CLOBEX) 0.05 % shampoo; Apply to scalp, lather, let sit 10 minutes then rinse.  Dispense: 118 mL; Refill: 5  -     clobetasoL (OLUX) 0.05 % Foam; AAA of scalp and ears bid prn psoriasis  Dispense: 50 g; Refill: 3        -     + discomfort and pruritus.  Will start above meds. If fails to improve, pt does have hx of anxiety/depression, on celexa.  Will most likely consider biologic given risk of depression worsening on Otezla.            Follow up in about 3 months (around 10/17/2023).

## 2023-07-24 ENCOUNTER — TELEPHONE (OUTPATIENT)
Dept: SPORTS MEDICINE | Facility: CLINIC | Age: 33
End: 2023-07-24
Payer: COMMERCIAL

## 2023-07-24 NOTE — TELEPHONE ENCOUNTER
----- Message from Marline Vidal sent at 7/24/2023  8:34 AM CDT -----  Regarding: pt advice  Contact: pt [phone]  Pt is calling in regards to request a letter of recommendation for school, regarding shoewear due to ladrum in the hip. Pls call

## 2023-07-24 NOTE — PROGRESS NOTES
Spoke with patient- requesting work accommodations letter to be continued for this school year- wear supportive shoes and sitting break q1h. Letter written and released to portal. Pt states she is hurting again but does not have time to come in for and office visit right now due to school starting soon. Encouraged pt to come in when she is able, advised on Sat clinic once per month. Pt states understanding and appreciation.     Yissel Bruce, MS, OTC  Clinical Assistant to Dr. Leida Kay

## 2023-08-18 ENCOUNTER — OFFICE VISIT (OUTPATIENT)
Dept: PRIMARY CARE CLINIC | Facility: CLINIC | Age: 33
End: 2023-08-18
Payer: COMMERCIAL

## 2023-08-18 VITALS
RESPIRATION RATE: 18 BRPM | HEART RATE: 65 BPM | DIASTOLIC BLOOD PRESSURE: 60 MMHG | OXYGEN SATURATION: 99 % | TEMPERATURE: 97 F | SYSTOLIC BLOOD PRESSURE: 104 MMHG | WEIGHT: 131.75 LBS | BODY MASS INDEX: 24.24 KG/M2 | HEIGHT: 62 IN

## 2023-08-18 DIAGNOSIS — M76.891 TENDINITIS OF RIGHT HIP: ICD-10-CM

## 2023-08-18 DIAGNOSIS — F32.A DEPRESSION, UNSPECIFIED DEPRESSION TYPE: ICD-10-CM

## 2023-08-18 DIAGNOSIS — Z80.0 FAMILY HX OF COLON CANCER: ICD-10-CM

## 2023-08-18 DIAGNOSIS — Z00.00 ANNUAL PHYSICAL EXAM: Primary | ICD-10-CM

## 2023-08-18 PROCEDURE — 99999 PR PBB SHADOW E&M-EST. PATIENT-LVL IV: ICD-10-PCS | Mod: PBBFAC,,, | Performed by: STUDENT IN AN ORGANIZED HEALTH CARE EDUCATION/TRAINING PROGRAM

## 2023-08-18 PROCEDURE — 1160F PR REVIEW ALL MEDS BY PRESCRIBER/CLIN PHARMACIST DOCUMENTED: ICD-10-PCS | Mod: CPTII,S$GLB,, | Performed by: STUDENT IN AN ORGANIZED HEALTH CARE EDUCATION/TRAINING PROGRAM

## 2023-08-18 PROCEDURE — 99395 PREV VISIT EST AGE 18-39: CPT | Mod: S$GLB,,, | Performed by: STUDENT IN AN ORGANIZED HEALTH CARE EDUCATION/TRAINING PROGRAM

## 2023-08-18 PROCEDURE — 99999 PR PBB SHADOW E&M-EST. PATIENT-LVL IV: CPT | Mod: PBBFAC,,, | Performed by: STUDENT IN AN ORGANIZED HEALTH CARE EDUCATION/TRAINING PROGRAM

## 2023-08-18 PROCEDURE — 1160F RVW MEDS BY RX/DR IN RCRD: CPT | Mod: CPTII,S$GLB,, | Performed by: STUDENT IN AN ORGANIZED HEALTH CARE EDUCATION/TRAINING PROGRAM

## 2023-08-18 PROCEDURE — 3078F DIAST BP <80 MM HG: CPT | Mod: CPTII,S$GLB,, | Performed by: STUDENT IN AN ORGANIZED HEALTH CARE EDUCATION/TRAINING PROGRAM

## 2023-08-18 PROCEDURE — 3008F BODY MASS INDEX DOCD: CPT | Mod: CPTII,S$GLB,, | Performed by: STUDENT IN AN ORGANIZED HEALTH CARE EDUCATION/TRAINING PROGRAM

## 2023-08-18 PROCEDURE — 1159F MED LIST DOCD IN RCRD: CPT | Mod: CPTII,S$GLB,, | Performed by: STUDENT IN AN ORGANIZED HEALTH CARE EDUCATION/TRAINING PROGRAM

## 2023-08-18 PROCEDURE — 3074F SYST BP LT 130 MM HG: CPT | Mod: CPTII,S$GLB,, | Performed by: STUDENT IN AN ORGANIZED HEALTH CARE EDUCATION/TRAINING PROGRAM

## 2023-08-18 PROCEDURE — 3078F PR MOST RECENT DIASTOLIC BLOOD PRESSURE < 80 MM HG: ICD-10-PCS | Mod: CPTII,S$GLB,, | Performed by: STUDENT IN AN ORGANIZED HEALTH CARE EDUCATION/TRAINING PROGRAM

## 2023-08-18 PROCEDURE — 99395 PR PREVENTIVE VISIT,EST,18-39: ICD-10-PCS | Mod: S$GLB,,, | Performed by: STUDENT IN AN ORGANIZED HEALTH CARE EDUCATION/TRAINING PROGRAM

## 2023-08-18 PROCEDURE — 3008F PR BODY MASS INDEX (BMI) DOCUMENTED: ICD-10-PCS | Mod: CPTII,S$GLB,, | Performed by: STUDENT IN AN ORGANIZED HEALTH CARE EDUCATION/TRAINING PROGRAM

## 2023-08-18 PROCEDURE — 3074F PR MOST RECENT SYSTOLIC BLOOD PRESSURE < 130 MM HG: ICD-10-PCS | Mod: CPTII,S$GLB,, | Performed by: STUDENT IN AN ORGANIZED HEALTH CARE EDUCATION/TRAINING PROGRAM

## 2023-08-18 PROCEDURE — 1159F PR MEDICATION LIST DOCUMENTED IN MEDICAL RECORD: ICD-10-PCS | Mod: CPTII,S$GLB,, | Performed by: STUDENT IN AN ORGANIZED HEALTH CARE EDUCATION/TRAINING PROGRAM

## 2023-08-18 NOTE — PROGRESS NOTES
"Subjective:           Patient ID: Kiley Vyas is a 33 y.o. female who presents today with a chief complaint of annual.    Chief Complaint:   Annual Exam      History of Present Illness:    32yo female presenting for annual appt.     Doing well today, states will be going to work out after appt.    Denies any acute concerns today.     Current has rx for Celexa 20mg daily, states has been on medication since about 2014.      Has a gyn appt in the next month and states she may want to adjust the SSRI with her.    Right hip pain:  Some issues with the hip, but reports that will not go away.    Chronic issues, flares at times.   MRI 2020 showing right hip labral tear.  Reports did extensive PT.     Now just takes it easy if it's hurting.     Review of Systems   Constitutional:  Negative for activity change, fatigue, fever and unexpected weight change.   HENT:  Negative for congestion, nosebleeds, sinus pressure and sneezing.    Respiratory:  Negative for cough, shortness of breath and wheezing.    Cardiovascular:  Negative for chest pain, palpitations and leg swelling.   Gastrointestinal:  Negative for abdominal distention, constipation, diarrhea and nausea.   Genitourinary:  Negative for difficulty urinating and dysuria.   Musculoskeletal:  Positive for arthralgias (recurrent right hip pain at times. Tolerable.). Negative for back pain and gait problem.   Skin:  Negative for pallor and rash.   Neurological:  Negative for weakness, numbness and headaches.   Psychiatric/Behavioral:  Negative for agitation and sleep disturbance. The patient is not nervous/anxious.            Objective:        Vitals:    08/18/23 0813 08/18/23 0832   BP: (!) 100/58 104/60   BP Location: Right arm Right arm   Patient Position: Sitting Sitting   BP Method: Medium (Manual) Medium (Manual)   Pulse: 65    Resp: 18    Temp: 97 °F (36.1 °C)    TempSrc: Temporal    SpO2: 99%    Weight: 59.8 kg (131 lb 11.6 oz)    Height: 5' 2" (1.575 m)  " "      Body mass index is 24.09 kg/m².      Physical Exam  Constitutional:       General: She is not in acute distress.     Appearance: Normal appearance. She is not ill-appearing.   HENT:      Head: Normocephalic and atraumatic.      Right Ear: External ear normal.      Left Ear: External ear normal.      Nose: No rhinorrhea.      Mouth/Throat:      Mouth: Mucous membranes are moist.   Eyes:      Extraocular Movements: Extraocular movements intact.      Conjunctiva/sclera: Conjunctivae normal.   Cardiovascular:      Rate and Rhythm: Normal rate and regular rhythm.      Pulses: Normal pulses.      Heart sounds: Normal heart sounds. No murmur heard.  Pulmonary:      Effort: Pulmonary effort is normal. No respiratory distress.   Abdominal:      Tenderness: There is no right CVA tenderness or left CVA tenderness.   Musculoskeletal:      Right lower leg: No edema.      Left lower leg: No edema.   Skin:     General: Skin is warm.      Capillary Refill: Capillary refill takes less than 2 seconds.      Coloration: Skin is not jaundiced.      Findings: No bruising.   Neurological:      General: No focal deficit present.      Mental Status: She is alert and oriented to person, place, and time.      Motor: No weakness.      Gait: Gait normal.   Psychiatric:         Mood and Affect: Mood normal.             Lab Results   Component Value Date     11/10/2021    K 4.4 11/10/2021     11/10/2021    CO2 22 (L) 11/10/2021    BUN 9 11/10/2021    CREATININE 0.8 11/10/2021    ANIONGAP 10 11/10/2021     No results found for: "HGBA1C"  No results found for: "BNP", "BNPTRIAGEBLO"    Lab Results   Component Value Date    WBC 7.45 06/21/2022    HGB 13.5 06/21/2022    HCT 40.3 06/21/2022     06/21/2022    GRAN 3.8 06/21/2022    GRAN 51.1 06/21/2022     Lab Results   Component Value Date    CHOL 194 11/10/2021    HDL 61 11/10/2021    LDLCALC 115.8 11/10/2021    TRIG 86 11/10/2021          Current Outpatient Medications:     " citalopram (CELEXA) 20 MG tablet, TAKE 1 TABLET(20 MG) BY MOUTH EVERY DAY, Disp: 90 tablet, Rfl: 3    clobetasoL (CLOBEX) 0.05 % shampoo, Apply to scalp, lather, let sit 10 minutes then rinse., Disp: 118 mL, Rfl: 5    clobetasoL (OLUX) 0.05 % Foam, AAA of scalp and ears bid prn psoriasis, Disp: 50 g, Rfl: 3     Outpatient Encounter Medications as of 8/18/2023   Medication Sig Dispense Refill    citalopram (CELEXA) 20 MG tablet TAKE 1 TABLET(20 MG) BY MOUTH EVERY DAY 90 tablet 3    clobetasoL (CLOBEX) 0.05 % shampoo Apply to scalp, lather, let sit 10 minutes then rinse. 118 mL 5    clobetasoL (OLUX) 0.05 % Foam AAA of scalp and ears bid prn psoriasis 50 g 3    [DISCONTINUED] gabapentin (NEURONTIN) 300 MG capsule Take 1 capsule (300 mg total) by mouth every evening. 30 capsule 1    [DISCONTINUED] olopatadine (PATANOL) 0.1 % ophthalmic solution Place 1 drop into the left eye 2 (two) times daily. 5 mL 0    [DISCONTINUED] promethazine-codeine 6.25-10 mg/5 ml (PHENERGAN WITH CODEINE) 6.25-10 mg/5 mL syrup Take 5 mLs by mouth every 6 (six) hours as needed for Cough. 90 mL 0     No facility-administered encounter medications on file as of 8/18/2023.          Assessment:       1. Annual physical exam    2. Depression, unspecified depression type    3. Tendinitis of right hip    4. Family hx of colon cancer           Plan:       Annual physical exam  -     CBC Auto Differential; Future; Expected date: 08/18/2023  -     Comprehensive Metabolic Panel; Future; Expected date: 08/18/2023  -     Lipid Panel; Future; Expected date: 08/18/2023  -     TSH; Future; Expected date: 08/18/2023    Depression, unspecified depression type  -     CBC Auto Differential; Future; Expected date: 08/18/2023  -     Comprehensive Metabolic Panel; Future; Expected date: 08/18/2023  -     Lipid Panel; Future; Expected date: 08/18/2023  -     TSH; Future; Expected date: 08/18/2023    Tendinitis of right hip  -     CBC Auto Differential; Future;  Expected date: 08/18/2023  -     Comprehensive Metabolic Panel; Future; Expected date: 08/18/2023  -     Lipid Panel; Future; Expected date: 08/18/2023  -     TSH; Future; Expected date: 08/18/2023    Family hx of colon cancer  Comments:  mother had colonc cancer age 51               Annual:   - doing well today.   - getting repeat annual labs.    Chronic Right hip Pain:  - Some issues with the hip, but reports that will not go away.    - Chronic issues, flares at times.   - MRI 2020 showing right hip labral tear.  Reports did extensive PT.    -Now just takes it easy if it's hurting.  Will continue to monitor.   - encouraged to keep active and continue to maintain a healthy weight as she has been.     Family hx of Colon Cancer:   - mother had colon cancer at age 51.    - patient had scope in 2014 - was wnl.   - advised could consider recheck in a year.    Depression/anxiety:   - taking Celexa 20mg daily.   - does not feel this is working as well as it used to.   - plans to discuss an adjustment at an appt she has scheduled.   - advised that is a reasonable request and if intending to speak with GYN that is fine.

## 2023-08-18 NOTE — PATIENT INSTRUCTIONS
Annual:   - doing well today.   - getting repeat annual labs.    Chronic Right hip Pain:  - Some issues with the hip, but reports that will not go away.    - Chronic issues, flares at times.   - MRI 2020 showing right hip labral tear.  Reports did extensive PT.    -Now just takes it easy if it's hurting.  Will continue to monitor.   - encouraged to keep active and continue to maintain a healthy weight as she has been.     Family hx of Colon Cancer:   - mother had colon cancer at age 51.    - patient had scope in 2014 - was wnl.   - advised could consider recheck in a year.    Depression/anxiety:   - taking Celexa 20mg daily.   - does not feel this is working as well as it used to.   - plans to discuss an adjustment at an appt she has scheduled.   - advised that is a reasonable request and if intending to speak with GYN that is fine.

## 2023-09-14 ENCOUNTER — OFFICE VISIT (OUTPATIENT)
Dept: OBSTETRICS AND GYNECOLOGY | Facility: CLINIC | Age: 33
End: 2023-09-14
Payer: COMMERCIAL

## 2023-09-14 VITALS
WEIGHT: 133.19 LBS | BODY MASS INDEX: 24.51 KG/M2 | DIASTOLIC BLOOD PRESSURE: 63 MMHG | SYSTOLIC BLOOD PRESSURE: 95 MMHG | HEIGHT: 62 IN

## 2023-09-14 DIAGNOSIS — Z01.419 ENCOUNTER FOR ANNUAL ROUTINE GYNECOLOGICAL EXAMINATION: Primary | ICD-10-CM

## 2023-09-14 PROCEDURE — 99395 PREV VISIT EST AGE 18-39: CPT | Mod: S$GLB,,, | Performed by: OBSTETRICS & GYNECOLOGY

## 2023-09-14 PROCEDURE — 3078F DIAST BP <80 MM HG: CPT | Mod: CPTII,S$GLB,, | Performed by: OBSTETRICS & GYNECOLOGY

## 2023-09-14 PROCEDURE — 1160F PR REVIEW ALL MEDS BY PRESCRIBER/CLIN PHARMACIST DOCUMENTED: ICD-10-PCS | Mod: CPTII,S$GLB,, | Performed by: OBSTETRICS & GYNECOLOGY

## 2023-09-14 PROCEDURE — 3008F PR BODY MASS INDEX (BMI) DOCUMENTED: ICD-10-PCS | Mod: CPTII,S$GLB,, | Performed by: OBSTETRICS & GYNECOLOGY

## 2023-09-14 PROCEDURE — 99395 PR PREVENTIVE VISIT,EST,18-39: ICD-10-PCS | Mod: S$GLB,,, | Performed by: OBSTETRICS & GYNECOLOGY

## 2023-09-14 PROCEDURE — 1159F MED LIST DOCD IN RCRD: CPT | Mod: CPTII,S$GLB,, | Performed by: OBSTETRICS & GYNECOLOGY

## 2023-09-14 PROCEDURE — 3008F BODY MASS INDEX DOCD: CPT | Mod: CPTII,S$GLB,, | Performed by: OBSTETRICS & GYNECOLOGY

## 2023-09-14 PROCEDURE — 1159F PR MEDICATION LIST DOCUMENTED IN MEDICAL RECORD: ICD-10-PCS | Mod: CPTII,S$GLB,, | Performed by: OBSTETRICS & GYNECOLOGY

## 2023-09-14 PROCEDURE — 3078F PR MOST RECENT DIASTOLIC BLOOD PRESSURE < 80 MM HG: ICD-10-PCS | Mod: CPTII,S$GLB,, | Performed by: OBSTETRICS & GYNECOLOGY

## 2023-09-14 PROCEDURE — 1160F RVW MEDS BY RX/DR IN RCRD: CPT | Mod: CPTII,S$GLB,, | Performed by: OBSTETRICS & GYNECOLOGY

## 2023-09-14 PROCEDURE — 99999 PR PBB SHADOW E&M-EST. PATIENT-LVL III: CPT | Mod: PBBFAC,,, | Performed by: OBSTETRICS & GYNECOLOGY

## 2023-09-14 PROCEDURE — 3074F SYST BP LT 130 MM HG: CPT | Mod: CPTII,S$GLB,, | Performed by: OBSTETRICS & GYNECOLOGY

## 2023-09-14 PROCEDURE — 3074F PR MOST RECENT SYSTOLIC BLOOD PRESSURE < 130 MM HG: ICD-10-PCS | Mod: CPTII,S$GLB,, | Performed by: OBSTETRICS & GYNECOLOGY

## 2023-09-14 PROCEDURE — 99999 PR PBB SHADOW E&M-EST. PATIENT-LVL III: ICD-10-PCS | Mod: PBBFAC,,, | Performed by: OBSTETRICS & GYNECOLOGY

## 2023-09-14 RX ORDER — CITALOPRAM 40 MG/1
40 TABLET, FILM COATED ORAL DAILY
Qty: 90 TABLET | Refills: 3 | Status: SHIPPED | OUTPATIENT
Start: 2023-09-14 | End: 2024-09-13

## 2023-09-14 NOTE — PROGRESS NOTES
SUBJECTIVE:     Chief Complaint: Well Woman       History of Present Illness:  Annual Exam  Patient presents for annual exam.   She c/o nothing today.  LMP: 23  She denies any vd, vb, dyspareunia, dysuria, depression, anxiety.  Last pap was in  and was neg. HPV neg.  Birth Control: declines  declines STD testing.     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    Seborrheic dermatitis     Strabismus      Past Surgical History:   Procedure Laterality Date    COLONOSCOPY  2014    STRABISMUS SURGERY  1994    Denervation and extirpation of IO ou    STRABISMUS SURGERY  2010    Recession LR OD 6mm, Resection MR OD 5mm     OB History          0    Para   0    Term   0       0    AB   0    Living   0         SAB   0    IAB   0    Ectopic   0    Multiple   0    Live Births   0               Family History   Problem Relation Age of Onset    Colon cancer Mother 51    Diabetes Paternal Grandfather     Cataracts Maternal Grandmother     Melanoma Father     Hypertension Father     Breast cancer Neg Hx     Ovarian cancer Neg Hx     Lung cancer Neg Hx      Social History     Tobacco Use    Smoking status: Never    Smokeless tobacco: Never   Substance Use Topics    Alcohol use: Yes     Comment: social, 1 drink weekly    Drug use: No       Current Outpatient Medications   Medication Sig    clobetasoL (CLOBEX) 0.05 % shampoo Apply to scalp, lather, let sit 10 minutes then rinse.    clobetasoL (OLUX) 0.05 % Foam AAA of scalp and ears bid prn psoriasis    citalopram (CELEXA) 40 MG tablet Take 1 tablet (40 mg total) by mouth once daily.     No current facility-administered medications for this visit.       Review of  Systems:  GENERAL: No fever, chills, fatigability or weight loss.  CARDIOVASCULAR: No chest pain. No palpitations.  RESPIRATORY: No SOB, no wheezing.  BREAST: Denies pain. No lumps. No discharge.  VULVAR: No pain, no lesions and no itching.  VAGINAL: No relaxation, no itching, no discharge, no abnormal bleeding and no lesions.  ABDOMEN: No abdominal pain. Denies nausea. Denies vomiting. No diarrhea. No constipation  URINARY: No incontinence, no nocturia, no frequency and no dysuria.  NEUROLOGICAL: No headaches. No vision changes.       OBJECTIVE:     Vitals:    09/14/23 1317   BP: 95/63       Patient verbally consents to pelvic and breast exams.  Physical Exam:  Gen: NAD, well developed, well-nourished  HEENT: Normocephalic, atraumatic  Eyes: EOM nl, conjuntivae normal  Neck: ROM normal, no thyromegaly  Respiratory: Effort normal   Abd: soft, nontender, no masses palpated  Breast: Normal bilaterally, no masses, lesions or tenderness. No nipple discharge on expression, no lymphadenopathy bilaterally.  SSE:  Vulva: no lesions or rashes  Cervix: No lesions noted, nonfriable, no vaginal discharge or vaginal bleeding noted  BME:   Cervix: No CMT  Adnexa: nl bilaterally, no masses or fullness palpated  Uterus: normal, nonenlarged  Musculoskeletal: normal ROM  Neuro: alert, AAOx3  Skin: warm and dry  Psych: mood/affect nl, behavior normal, judgement normal, thought content normal        ASSESSMENT:       ICD-10-CM ICD-9-CM    1. Encounter for annual routine gynecological examination  Z01.419 V72.31              Plan:      Kiley was seen today for well woman.    Diagnoses and all orders for this visit:    Encounter for annual routine gynecological examination    Other orders  -     citalopram (CELEXA) 40 MG tablet; Take 1 tablet (40 mg total) by mouth once daily.        No orders of the defined types were placed in this encounter.      Follow up in one year for annual, or prn.    Julie R Jeansonne

## 2023-09-15 ENCOUNTER — HOSPITAL ENCOUNTER (EMERGENCY)
Facility: HOSPITAL | Age: 33
Discharge: HOME | End: 2023-09-15
Attending: EMERGENCY MEDICINE
Payer: COMMERCIAL

## 2023-09-15 ENCOUNTER — TELEPHONE (OUTPATIENT)
Dept: OPHTHALMOLOGY | Facility: CLINIC | Age: 33
End: 2023-09-15
Payer: COMMERCIAL

## 2023-09-15 ENCOUNTER — NURSE TRIAGE (OUTPATIENT)
Dept: ADMINISTRATIVE | Facility: CLINIC | Age: 33
End: 2023-09-15
Payer: COMMERCIAL

## 2023-09-15 VITALS
SYSTOLIC BLOOD PRESSURE: 121 MMHG | HEART RATE: 71 BPM | RESPIRATION RATE: 16 BRPM | BODY MASS INDEX: 23.39 KG/M2 | DIASTOLIC BLOOD PRESSURE: 67 MMHG | TEMPERATURE: 98 F | OXYGEN SATURATION: 100 % | WEIGHT: 132 LBS | HEIGHT: 63 IN

## 2023-09-15 DIAGNOSIS — S05.92XA LEFT EYE INJURY, INITIAL ENCOUNTER: ICD-10-CM

## 2023-09-15 DIAGNOSIS — S05.02XA ABRASION OF LEFT CORNEA, INITIAL ENCOUNTER: Primary | ICD-10-CM

## 2023-09-15 PROCEDURE — 99284 EMERGENCY DEPT VISIT MOD MDM: CPT

## 2023-09-15 PROCEDURE — 92100 SERIAL TONOMETRY: CPT

## 2023-09-15 PROCEDURE — 25000003 PHARM REV CODE 250: Performed by: PHYSICIAN ASSISTANT

## 2023-09-15 RX ORDER — ERYTHROMYCIN 5 MG/G
OINTMENT OPHTHALMIC
Qty: 3.5 G | Refills: 0 | Status: SHIPPED | OUTPATIENT
Start: 2023-09-15

## 2023-09-15 RX ORDER — ERYTHROMYCIN 5 MG/G
OINTMENT OPHTHALMIC
Status: COMPLETED | OUTPATIENT
Start: 2023-09-15 | End: 2023-09-15

## 2023-09-15 RX ORDER — NAPROXEN 500 MG/1
500 TABLET ORAL 2 TIMES DAILY WITH MEALS
Qty: 28 TABLET | Refills: 0 | Status: SHIPPED | OUTPATIENT
Start: 2023-09-15 | End: 2023-09-29

## 2023-09-15 RX ORDER — PROPARACAINE HYDROCHLORIDE 5 MG/ML
1 SOLUTION/ DROPS OPHTHALMIC
Status: COMPLETED | OUTPATIENT
Start: 2023-09-15 | End: 2023-09-15

## 2023-09-15 RX ADMIN — PROPARACAINE HYDROCHLORIDE 1 DROP: 5 SOLUTION/ DROPS OPHTHALMIC at 08:09

## 2023-09-15 RX ADMIN — ERYTHROMYCIN 1 INCH: 5 OINTMENT OPHTHALMIC at 09:09

## 2023-09-15 RX ADMIN — FLUORESCEIN SODIUM 1 EACH: 1 STRIP OPHTHALMIC at 08:09

## 2023-09-15 NOTE — TELEPHONE ENCOUNTER
----- Message from Farzana Guzman sent at 9/15/2023 11:38 AM CDT -----  Consult/Advisory    Name Of Caller:Kiley       Contact Preference:[phone]    Nature of call: Ptn was accidentally poked in the eye she stated that the eye is red and tearing and she stated she is in pain

## 2023-09-15 NOTE — TELEPHONE ENCOUNTER
Patient bent down and hit her eye on the corner of a desk this morning. She was seen in urgent care earlier and was told that she has a scratched cornea and was referred to the ED. Was told that they did not have the equipment to treat the cornea at the urgent care. Noted in epic that patient called ophthalmology and was offered an appointment and declined. Caller states that she was at work at the time. Triage done and advised to ED.  Reason for Disposition   [1] Eye pain AND [2] light increases pain    Additional Information   Negative: [1] Major bleeding (e.g., actively dripping or spurting) AND [2] can't be stopped   Negative: Knocked out (unconscious) > 1 minute   Negative: Difficult to awaken or acting confused (e.g., disoriented, slurred speech)   Negative: Severe neck pain   Negative: Sounds like a life-threatening emergency to the triager   Negative: Wound looks infected   Negative: Foreign body in the eye   Negative: Head injury is main concern   Negative: Neck injury is main concern   Negative: Vision is blurred or lost in either eye   Negative: Double vision or unable to look upwards   Negative: Cut on eyelid or eyeball  (Exception: Superficial scratch on eyelid.)   Negative: [1] Bleeding AND [2] won't stop after 10 minutes of direct pressure (using correct technique)   Negative: Skin is split open or gaping (or length > 1/4 inch or 6 mm)   Negative: Bloody or cloudy fluid behind the cornea (clear part)   Negative: Sharp object hit the eye (e.g., metallic chip or flying glass)   Negative: Foreign body (FB) hit eye at high speed (e.g., small metallic chip from hammering, lawnmower, BB gun, explosion)   Negative: Two black eyes (bilateral)   Negative: Sounds like a serious injury to the triager   Negative: [1] SEVERE pain AND [2] not improved 2 hours after pain medicine/ice packs    Protocols used: Eye Injury-A-

## 2023-09-16 NOTE — ED PROVIDER NOTES
Encounter Date: 9/15/2023       History     Chief Complaint   Patient presents with    Eye Problem     Hit L eye on corner of table. Was seen at [facility] [place] and told to have multiple abrasions. + blurred vision.      33 y.o. female with PMHx of Exophoria, strabismus, depression presents to the ED from Urgent care s/p eye injury.  This morning she accidentally hit her left eye on the corner of a table. Today, she experienced eye pain, tearing, and eye redness.  She went to urgent care and was told that she would multiple abrasions of her left eye.  She was advised to go to the ED for further evaluation.  She does not wear contacts. She denies visual changes or sensitivity to light.    The history is provided by the patient.     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    Seborrheic dermatitis     Strabismus      Past Surgical History:   Procedure Laterality Date    COLONOSCOPY  2014    STRABISMUS SURGERY  05/06/1994    Denervation and extirpation of IO ou    STRABISMUS SURGERY  05/12/2010    Recession LR OD 6mm, Resection MR OD 5mm     Family History   Problem Relation Age of Onset    Colon cancer Mother 51    Diabetes Paternal Grandfather     Cataracts Maternal Grandmother     Melanoma Father     Hypertension Father     Breast cancer Neg Hx     Ovarian cancer Neg Hx     Lung cancer Neg Hx      Social History     Tobacco Use    Smoking status: Never    Smokeless tobacco: Never   Substance Use Topics    Alcohol use: Yes     Comment: social, 1 drink weekly    Drug use: No     Review of Systems   Eyes:  Positive for pain, discharge and redness. Negative for photophobia and visual disturbance.       Physical Exam     Initial Vitals [09/15/23 1902]   BP Pulse Resp Temp SpO2   128/73 82 18 98.8 °F (37.1 °C) 100 %      MAP       --         Physical Exam    Nursing note and vitals reviewed.  Constitutional: She appears well-developed and  well-nourished. She is not diaphoretic. No distress.   HENT:   Head: Normocephalic and atraumatic.   Nose: Nose normal.   Eyes: EOM and lids are normal. Pupils are equal, round, and reactive to light. Lids are everted and swept, no foreign bodies found. Right eye exhibits no discharge. Left eye exhibits no chemosis and no discharge. Left conjunctiva is injected. Left conjunctiva has no hemorrhage.   One small area of fluorescein uptake noted over superior aspect of iris    Neck: Neck supple.   Cardiovascular:  Normal rate and regular rhythm.           Pulmonary/Chest: No respiratory distress.   Musculoskeletal:      Cervical back: Neck supple.     Neurological: She is alert and oriented to person, place, and time. Gait normal.   Skin: No rash noted.   Psychiatric: She has a normal mood and affect. Thought content normal.         ED Course   Procedures  Labs Reviewed   HIV 1 / 2 ANTIBODY   HEPATITIS C ANTIBODY          Imaging Results    None          Medications   erythromycin 5 mg/gram (0.5 %) ophthalmic ointment (has no administration in time range)   fluorescein ophthalmic strip 1 each (1 each Both Eyes Given by Provider 9/15/23 2015)   proparacaine 0.5 % ophthalmic solution 1 drop (1 drop Both Eyes Given by Provider 9/15/23 2015)     Medical Decision Making  33 y.o. female with PMHx of Exophoria, strabismus, depression presents to the ED from Urgent care s/p eye injury.  Nontoxic appearing.  Hemodynamically stable.  Exam as above    Differential diagnosis includes corneal abrasion, corneal ulcer, conjunctivitis, uveitis     Left eye stained with fluorescein and visualized under Wood's lamp.  Small corneal abrasion noted over superior aspect of the iris.  Patient notes improvement with proparacaine drop. Patient denies visual changes. Visual acuity today is 20/70 in left eye and 20/50 in right eye.  Her baseline is 20/50 in both eyes as of 6/2023.  IOP at 13 in left eye. Will treat with erythromycin ointment.  Advised to use lubricating drop OTC as well. Referral place with ophthalmology for follow up. Strict ED precautions given to return for new, worsening, or concerning symptoms       Amount and/or Complexity of Data Reviewed  External Data Reviewed: notes.     Details: 6/15/2023  Visual Acuity (Snellen - Linear)    Right and left    Dist cc 20/50  blurry -1  Near cc 20/50     Tonometry- Pressure of 18 bilaterally     Risk  Prescription drug management.                               Clinical Impression:   Final diagnoses:  [S05.02XA] Abrasion of left cornea, initial encounter (Primary)  [S05.92XA] Left eye injury, initial encounter        ED Disposition Condition    Discharge Stable          ED Prescriptions       Medication Sig Dispense Start Date End Date Auth. Provider    erythromycin (ROMYCIN) ophthalmic ointment Place a 1/2 inch ribbon of ointment into the lower eyelid 4x per day for 5 days 3.5 g 9/15/2023 -- Summer Ramírez PA-C    naproxen (NAPROSYN) 500 MG tablet Take 1 tablet (500 mg total) by mouth 2 (two) times daily with meals. for 14 days 28 tablet 9/15/2023 9/29/2023 Summer Ramírez PA-C          Follow-up Information    None          Summer Ramírez PA-C  09/15/23 2100

## 2023-09-16 NOTE — ED TRIAGE NOTES
Kiley Vyas, a 33 y.o. female presents to the ED w/ complaint of L eye pain and blurred vision after hitting eye on corner of table. Pt seen at  and told she has abrasions    Triage note:  Chief Complaint   Patient presents with    Eye Problem     Hit L eye on corner of table. [place] [facility] told to have multiple abrasions. + blurred vision.      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

## 2023-09-16 NOTE — DISCHARGE INSTRUCTIONS
With a corneal abrasion today in the emergency department  You were prescribed an antibiotic ointment.  Use as directed  Your were also prescribed naproxen for pain. Take as needed and directed.  Make sure to take with meals.  Do not take with other ibuprofen containing products  Referral placed with ophthalmology for follow up if your symptoms are not improving   Strict ED precautions given to return immediately for new, worsening, or concerning symptoms.  This may include but is not limited to visual changes, increased eye pain, pressure behind your eye

## 2023-09-18 ENCOUNTER — TELEPHONE (OUTPATIENT)
Dept: OPHTHALMOLOGY | Facility: CLINIC | Age: 33
End: 2023-09-18
Payer: COMMERCIAL

## 2023-09-18 ENCOUNTER — PATIENT MESSAGE (OUTPATIENT)
Dept: PRIMARY CARE CLINIC | Facility: CLINIC | Age: 33
End: 2023-09-18
Payer: COMMERCIAL

## 2023-09-18 NOTE — TELEPHONE ENCOUNTER
----- Message from Carrie Larios sent at 9/18/2023  8:03 AM CDT -----  Regarding: ED F/U  Patient called to schedule ED F/U.     Call [phone]

## 2023-09-18 NOTE — TELEPHONE ENCOUNTER
Spoke to patient.  She was seen in the ED and given antibiotic ointment and referred to Ophthalmology.  She sees Dr. King and is waiting for a return call from his office.

## 2023-09-18 NOTE — TELEPHONE ENCOUNTER
----- Message from Carrie Larios sent at 9/18/2023  9:48 AM CDT -----  Regarding: ED F/U  Patients father called to schedule pt for ED F/U.    Call back- [phone]

## 2023-09-20 ENCOUNTER — OFFICE VISIT (OUTPATIENT)
Dept: OPHTHALMOLOGY | Facility: CLINIC | Age: 33
End: 2023-09-20
Payer: COMMERCIAL

## 2023-09-20 DIAGNOSIS — S05.02XA ABRASION OF LEFT CORNEA, INITIAL ENCOUNTER: Primary | ICD-10-CM

## 2023-09-20 PROCEDURE — 1159F PR MEDICATION LIST DOCUMENTED IN MEDICAL RECORD: ICD-10-PCS | Mod: CPTII,S$GLB,, | Performed by: OPHTHALMOLOGY

## 2023-09-20 PROCEDURE — 1159F MED LIST DOCD IN RCRD: CPT | Mod: CPTII,S$GLB,, | Performed by: OPHTHALMOLOGY

## 2023-09-20 PROCEDURE — 1160F PR REVIEW ALL MEDS BY PRESCRIBER/CLIN PHARMACIST DOCUMENTED: ICD-10-PCS | Mod: CPTII,S$GLB,, | Performed by: OPHTHALMOLOGY

## 2023-09-20 PROCEDURE — 92002 PR EYE EXAM, NEW PATIENT,INTERMED: ICD-10-PCS | Mod: S$GLB,,, | Performed by: OPHTHALMOLOGY

## 2023-09-20 PROCEDURE — 99999 PR PBB SHADOW E&M-EST. PATIENT-LVL II: CPT | Mod: PBBFAC,,, | Performed by: OPHTHALMOLOGY

## 2023-09-20 PROCEDURE — 99999 PR PBB SHADOW E&M-EST. PATIENT-LVL II: ICD-10-PCS | Mod: PBBFAC,,, | Performed by: OPHTHALMOLOGY

## 2023-09-20 PROCEDURE — 1160F RVW MEDS BY RX/DR IN RCRD: CPT | Mod: CPTII,S$GLB,, | Performed by: OPHTHALMOLOGY

## 2023-09-20 PROCEDURE — 92002 INTRM OPH EXAM NEW PATIENT: CPT | Mod: S$GLB,,, | Performed by: OPHTHALMOLOGY

## 2023-09-20 NOTE — PROGRESS NOTES
HPI    ED f/u     Pt is here today for corneal abrasion OS. Pt states that on 9/15 she hit   OS on corner of table. Pt stated that after the injury OS was in pain,   mucus, tearing, and swelling. Pt stated that all symptoms have improved.   Pt denies pain.    GTTS:  Erythromycin OS QID    Last edited by Jennifer Coombs MA on 9/20/2023  8:38 AM.            Assessment /Plan     For exam results, see Encounter Report.    Abrasion of left cornea, initial encounter      Resolved

## 2023-10-18 ENCOUNTER — PATIENT MESSAGE (OUTPATIENT)
Dept: CARDIOLOGY | Facility: CLINIC | Age: 33
End: 2023-10-18
Payer: COMMERCIAL

## 2024-04-16 ENCOUNTER — TELEPHONE (OUTPATIENT)
Dept: DERMATOLOGY | Facility: CLINIC | Age: 34
End: 2024-04-16
Payer: COMMERCIAL

## 2024-04-16 NOTE — TELEPHONE ENCOUNTER
----- Message from Cathy Abad sent at 4/16/2024  8:48 AM CDT -----  Regarding: follow up on prior message  Contact: [phone]  Type: Needs Medical Advice    Who Called:  Dad //     Best Call Back Number: [phone]    Additional Information: Patient stated has been waiting on call back since last week to set up an appt. They want an appointment before October. Please call to advise

## 2024-06-26 RX ORDER — CITALOPRAM 40 MG/1
40 TABLET, FILM COATED ORAL
Qty: 90 TABLET | Refills: 0 | Status: SHIPPED | OUTPATIENT
Start: 2024-06-26

## 2024-06-26 NOTE — TELEPHONE ENCOUNTER
Refill Decision Note   Kiley Vyas  is requesting a refill authorization.  Brief Assessment and Rationale for Refill:  Approve     Medication Therapy Plan:         Comments:     Note composed:10:07 AM 06/26/2024

## 2024-07-15 ENCOUNTER — PATIENT MESSAGE (OUTPATIENT)
Dept: PRIMARY CARE CLINIC | Facility: CLINIC | Age: 34
End: 2024-07-15
Payer: COMMERCIAL

## 2024-07-16 ENCOUNTER — TELEPHONE (OUTPATIENT)
Dept: PRIMARY CARE CLINIC | Facility: CLINIC | Age: 34
End: 2024-07-16
Payer: COMMERCIAL

## 2024-07-16 NOTE — TELEPHONE ENCOUNTER
----- Message from Brinasabina Toussaintez sent at 7/16/2024  9:11 AM CDT -----  Contact: [phone]  1MEDICALADVICE     Patient is calling for Medical Advice regarding:metrol pack     How long has patient had these symptoms:    Pharmacy name and phone#:   [id number] - [place] - [address] AT [facility] [address]  [address] [address]  Phone: [phone] Fax: [phone]       Patient wants a call back or thru [facility]:    Comments:  Pt states she emailed the office in regards to the steroid  and she states she is needing thios ASAP please advise       Please advise patient replies from provider may take up to 48 hours.

## 2024-07-17 NOTE — PROGRESS NOTES
Subjective:     Kiley Cao is a 34 y.o. female coming in today for right hip pain.   Pt reports pain has been off and on since seen last, but bothering her more frequently for the past few months. She reports pain is isolated to the anterior hip at the iliac crest (confirmed w/ palpation). She qualifies pain as achy but occasionally sharp that does not radiate, and rates as 3/10 day-to-day ; 7.5/10 at its worst.   The pain is better with rest and worse with kneeling at Spirit[employer], squatting, stairs, after sitting for prolonged periods or with wearing heels/wedges. Pain elicited with resisted hip ABD and IR. No attempted treatments since last visit.  Inconsistently wearing heel lift when in sneakers - states she is not getting relief from it. 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  There has not been any new a fall/injury/ or traumas since last visit.  Pt. reports onset of chest pain since Monday with a history of recurrent costochondritis over the last 3yrs which has resolved with a medrol dose pack in the past.      Office note from 8/2/21 reviewed    Joint instability? no  Mechanical locking/clicking? no  Affecting ADL's? yes  Affecting sleep? yes    Occupation:     Review of Systems   Constitutional:  Negative for chills and fever.   Musculoskeletal:  Negative for back pain, falls, joint pain, myalgias and neck pain.   Neurological:  Negative for dizziness, tingling, focal weakness, weakness and headaches.       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    Seborrheic dermatitis     Strabismus      PAST SURGICAL HISTORY:   Past Surgical History:   Procedure Laterality Date    COLONOSCOPY  2014    STRABISMUS SURGERY  05/06/1994    Denervation and extirpation of IO ou    STRABISMUS SURGERY  05/12/2010    Recession LR OD 6mm, Resection MR OD 5mm     FAMILY  HISTORY:   Family History   Problem Relation Name Age of Onset    Colon cancer Mother  51    Diabetes Paternal Grandfather      Cataracts Maternal Grandmother      Melanoma Father      Hypertension Father      Breast cancer Neg Hx      Ovarian cancer Neg Hx      Lung cancer Neg Hx       SOCIAL HISTORY:   Social History     Socioeconomic History    Marital status: Single   Tobacco Use    Smoking status: Never    Smokeless tobacco: Never   Substance and Sexual Activity    Alcohol use: Yes     Comment: social, 1 drink weekly    Drug use: No    Sexual activity: Not Currently     Partners: Male     Birth control/protection: None   Other Topics Concern    Are you pregnant or think you may be? No    Breast-feeding No     Social Determinants of Health     Financial Resource Strain: Low Risk  (7/15/2024)    Overall Financial Resource Strain (CARDIA)     Difficulty of Paying Living Expenses: Not very hard   Food Insecurity: No Food Insecurity (7/15/2024)    Hunger Vital Sign     Worried About Running Out of Food in the Last Year: Never true     Ran Out of Food in the Last Year: Never true   Physical Activity: Sufficiently Active (7/15/2024)    Exercise Vital Sign     Days of Exercise per Week: 5 days     Minutes of Exercise per Session: 60 min   Stress: No Stress Concern Present (7/15/2024)    [employer] - Occupational Stress Questionnaire     Feeling of Stress : Only a little   Housing Stability: Unknown (7/15/2024)    Housing Stability Vital Sign     Unable to Pay for Housing in the Last Year: No       MEDICATIONS:   Current Outpatient Medications:     citalopram (CELEXA) 40 MG tablet, TAKE 1 TABLET(40 MG) BY MOUTH EVERY DAY, Disp: 90 tablet, Rfl: 0    clobetasoL (CLOBEX) 0.05 % shampoo, Apply to scalp, lather, let sit 10 minutes then rinse., Disp: 118 mL, Rfl: 5    clobetasoL (OLUX) 0.05 % Foam, AAA of scalp and ears bid prn psoriasis, Disp: 50 g, Rfl: 3    erythromycin (ROMYCIN) ophthalmic  ointment, Place a 1/2 inch ribbon of ointment into the lower eyelid 4x per day for 5 days, Disp: 3.5 g, Rfl: 0    meloxicam (MOBIC) 15 MG tablet, Take 1 tablet (15 mg total) by mouth once daily., Disp: 30 tablet, Rfl: 0  ALLERGIES: Review of patient's allergies indicates:  No Known Allergies    Reviewed office visit on 21 with Dr. Ruffin: No relief right rectus femorals tendon sheath steroid injection    Reviewed office visit on 21 with Dr. Ruffin: right rectus femoris tendon sheath injection under ultrasound    Reviewed office visit on 21 with Dr. Ruffin: chronic right anterior hip pain    Reviewed office visit on 20 with Dr. Boston:  Right hip pain, flexor (rectus) tendonitis; suspect incidental labral tear on MRI    Reviewed office visit on 10/30/20 with Dr. Enriquez: tendinitis, positive CARMEN. Low suspicion for psoraitic arthritis but will finish up the work up.  IMAGIN. X-ray ordered due to bilateral hip pain. (AP pelvis and frogleg lateral  left views) taken 10/30/20.   2. X-ray images were reviewed personally by me and then directly with patient.  3. FINDINGS: Hip joint spaces are normally maintained, without narrowing on either side.  No conventional radiographic evidence to specifically suggest avascular necrosis of either femoral head.  No evidence of recent or healing fracture, lytic destructive process, or femoroacetabular impingement.  SI joints appear unremarkable.  4. IMPRESSION: No pathology or irregularities appreciated.     IMAGIN. MRI ordered due to bilateral hip pain taken 20.   2. MRI images were reviewed personally by me and then directly with patient.  3. FINDINGS: Osseous Structures: No fracture, avascular necrosis or other abnormality.No marrow signal abnormality to suggest bone marrow replacement process. Hip and Sacroiliac joints: No evidence of joint effusion or other abnormalities.Tear at the level of the anterior superior labrum on the RIGHT.   Fibrocystic change RIGHT femoral head neck junction Bursae: No trochanteric or iliopsoas bursitis. Soft Tissues: Muscles and tendons of the pelvis and both hips show no atrophy, edema, mass, tears or other abnormalities.  4. IMPRESSION: Findings consistent with anterosuperior RIGHT-sided labral tear.  Underlying fibrocystic change RIGHT femoral head neck junction, incidental.        Objective:     VITAL SIGNS: /78   Pulse 63    General    Vitals reviewed.  Constitutional: She is oriented to person, place, and time. She appears well-developed and well-nourished.   Neurological: She is alert and oriented to person, place, and time.   Psychiatric: She has a normal mood and affect. Her behavior is normal.               MUSCULOSKELETAL EXAM    + R5-7 anterior rib tenderness bilaterally at the costosternal junction    HIP: right HIP  The affected hip is compared to the contralateral hip.    Observation:    There is no edema, erythema, or ecchymosis in the lumbosacral region.   There is no Trendelenburg sign on either side  No obvious pelvic obliquity while standing.    No thoracolumbar scoliosis observed.    No midline skin abnormalities.  No atrophy noted in the lower limbs.  + left short leg, persistent following OMT- corrected with 3 mm left heel lift  Gait: Non-antalgic with Over supination ankle mechanics and Neutral medial arch  Improved bilateral pelvic stability with decreased bilateral hip hiking compensatory pattern noted with single leg raise,  but some instability still present  + mica resting calf muscle tone    ROM (* = with pain):  Passive hip flexion to 120° on left and 120° on right  Passive hip internal rotation to 45° on left and 45° on right  Passive hip external rotation to 45° on left and 45° on right*   Passive hip abduction to 45° on left and 45° on right    Tenderness To Palpation:  No tenderness at the ASIS, AIIS, PSIS, PIIS,pubic bones, ischial tuberosity.  No tenderness at the anterior  iliac crest at the TFL muscle origin  No tenderness throughout the lumbar spine, iliolumbar region, or posterior pelvis.  No tenderness throughout the sacrum or piriformis  No tenderness over the greater trochanteric bursa or greater/lesser trochanters.  No tenderness at the glut attachments on the greater trochanter  No tenderness over proximal IT band   + tenderness at the right  hip flexor musculature.    Strength Testing (* = with pain):  Hip flexion - 5/5 on left and 5/5 on right  Hip extension - 5/5 on left and 5/5 on right  Hip adduction - 5/5 on left and 5/5 on right  Hip abduction - 5/5 on left and 5/5 on right  Knee flexion - 5/5 on left and 5/5 on right  Knee extension - 5/5 on left and 5/5 on right  Glutaeus medius - 5/5 on left and 5/5 on right    Special Tests:  Standing Trendelenburg test - negative    Seated straight leg raise - negative  Supine straight leg raise - negative  Hamstring flexibility symmetric    Log roll - negative  ABDON - positive for right iliacus tenderness  FADIR - negative  Scour test - negative  No pain with posterior hip capsule compression    ASIS compression test - negative  SI drawer test - negative   Thigh thrust test - negative     Piriformis test (Bonnet's) - negative  Quadriceps flexibility symmetric.  Luis Carlos test - negative  Archie compression test - negative    Fulcrum test - negative     Neurovascular Exam:  2+ femoral, DP, and PT pulses BL.  No skin changes, no abnormal hair distribution.  Sensation intact to light touch throughout the obturator and medial/lateral/posterior femoral cutaneous nerves.  3/4 reflexes at L4 and S1 dermatomes bilaterally  Capillary refill intact to <2 seconds in all lower limb digits.    TART (Tissue texture abnormality, Asymmetry,  Restriction of motion and/or Tenderness) changes:       Thoracic Spine   T1 Neutral   T2 Neutral   T3 Neutral   T4 Neutral   T5 Neutral   T6 FRS RIGHT   T7 FRS RIGHT   T8 FRS RIGHT   T9 Neutral   T10 Neutral   T11  Neutral   T12 Neutral     Rib cage: R6-7 TTA on right     Lumbar Spine   L1 Neutral   L2 Neutral   L3 Neutral   L4 Neutral   L5 FRS RIGHT      Pelvis:  Innominate:Left superior shear  Pubic bone:Left superior pubic shear    Sacrum:Right unilateral extension    Lower extremity: R psoas anterior tender point and Herniated trigger point (HTP) fascial distortion       Key   F= Flexed   E = Extended   R = Rotated   S = Sidebent   TTA = tissue texture abnormality       Assessment:      Encounter Diagnoses   Name Primary?    Chronic right hip pain Yes    Psoas tendonitis of right side     Costochondritis, acute     Abnormal increased muscle tone     Myalgia     Somatic dysfunction of thoracic region     Somatic dysfunction of rib cage region     Somatic dysfunction of lumbar region     Sacral region somatic dysfunction     Somatic dysfunction of pelvic region     Somatic dysfunction of lower extremity             Plan:   1.  Recurrent right hip pain with associated irritation of the hip flexor musculature on physical exam today.  Underlying left short-leg pattern also likely contributing to biomechanical restrictions the lower kinetic chain- corrected with left 3 mm heel lift.  No clear correlating physical exam findings with underlying right labral tear as noted on previous right hip MRI.  - OMT performed again today to address associated biomechanical restrictions  and HEP reviewed.   - recommend ice of 20 min at a time as needed pain control  - Recommend restarting left 3 mm heel lift wear for correction of left short-leg   - Letter written to today to continue wear of supportive shoes and frequent breaks to sit while teaching  - Rx given for Meloxicam 15 mg po qday x 14 days then prn for pain control. Pt. Advised to avoid all other NSAIDS while on this medication.  - underlying high calf muscle tone and lower extremity hyperreflexia noted on physical exam today.  Referral to neurology for further evaluation of this.  -   X-ray images of bilateral hips taken 10/30/20 (AP pelvis and frogleg lateral  bilateral views) showed no abnormalities. Images were personally reviewed.  -bilateral hip MRI with and without contrast from 11/23/2020 showed an anterosuperior RIGHT-sided labral tear, however no correlating physical exam findings of this today.  Underlying fibrocystic change of RIGHT femoral head neck junction, incidental.    2. Midline sternal and rib pain likely secondary to costochondritis with associated biomechanical restrictions of the upper kinetic chain  - OMT performed today to address associated biomechanical restrictions  and HEP started.   - Rx given for Meloxicam 15 mg po qday x 14 days then prn for pain control. Pt. Advised to avoid all other NSAIDS while on this medication.    3. OMT 5-6 regions. Oral consent obtained.  Reviewed benefits and potential side effects, including bruising at site of treatment and increased soreness for the next 24-48 hours. Pt. Instructed to increased water intake by 1 L today and tomorrow to help with any residual soreness.  - OMT indicated today due to signs and symptoms as well as local and remote somatic dysfunction findings and their related neurokinetic, lymphatic, fascial and/or arteriovenous body connections.   - OMT techniques used: Myofascial Release, Muscle Energy, High Velocity Low Amplitude, Still's Technique, and Fascial Distortion Model   - Treatment was tolerated well. Improvement noted in segmental mobility post-treatment in dysfunctional regions. There were no adverse events and no complications immediately following treatment.     4.  Reviewed with patient the following HEP:  restart:  A)  Pelvic clock exercises given to do from the 6-12 o'clock positions:10-15 reps, twice daily. Hand out of exercise also given.   B) Clam shell exercises bilaterally: hold leg in abducted and externally rotated position for 5-10 seconds, repeat 5-10 times  C) Lower abdominal muscle isotonic  exercises: Rotate pelvis into the 6 o'clock position and holding it to engage lower abdominal muscles. Then lift up leg, one at a time, alternating for 10-15 reps. Repeat exercises 1-2 times daily. Handout given.   D) Bilateral Psoas lunge stretch: hold stretch for 30 seconds, repeat 2-3 times, twice daily    [address]80 HOME EXERCISE PROGRAM (HEP):  The patient was taught a homegoing physical therapy regimen as described above. The patient demonstrated understanding of the exercises and proper technique of their execution. This interaction took 15 minutes.     5. Follow-up in 3 weeks for reevaluation    6. Patient agreeable to today's plan and all questions were answered    This note is dictated using the M*Modal Fluency Direct word recognition program. There are word recognition mistakes that are occasionally missed on review.      Total time spent face-to face with patient counseling or coordinating care including prognosis, differential diagnosis, risks and benefits of treatment, instructions, compliance risk reductions as well as non-face-to-face time personally spent reviewing medial record, medical documentation, and coordination of care.     EST MINUTES X   [id number] 10-19    [id number] 20-29    [id number] 30-39    [id number] 40-54 x   NEW     [id number] 15-29    [id number] 30-44    [id number] 45-59    [id number] 60-74    PHONE      5-10    [id number] 11-20    [id number] 21-30

## 2024-07-18 ENCOUNTER — OFFICE VISIT (OUTPATIENT)
Dept: SPORTS MEDICINE | Facility: CLINIC | Age: 34
End: 2024-07-18
Payer: COMMERCIAL

## 2024-07-18 VITALS — DIASTOLIC BLOOD PRESSURE: 78 MMHG | HEART RATE: 63 BPM | SYSTOLIC BLOOD PRESSURE: 113 MMHG

## 2024-07-18 DIAGNOSIS — M94.0 COSTOCHONDRITIS, ACUTE: ICD-10-CM

## 2024-07-18 DIAGNOSIS — M99.06 SOMATIC DYSFUNCTION OF LOWER EXTREMITY: ICD-10-CM

## 2024-07-18 DIAGNOSIS — M25.551 CHRONIC RIGHT HIP PAIN: Primary | ICD-10-CM

## 2024-07-18 DIAGNOSIS — M76.11 PSOAS TENDONITIS OF RIGHT SIDE: ICD-10-CM

## 2024-07-18 DIAGNOSIS — M99.03 SOMATIC DYSFUNCTION OF LUMBAR REGION: ICD-10-CM

## 2024-07-18 DIAGNOSIS — M99.02 SOMATIC DYSFUNCTION OF THORACIC REGION: ICD-10-CM

## 2024-07-18 DIAGNOSIS — M62.89 ABNORMAL INCREASED MUSCLE TONE: ICD-10-CM

## 2024-07-18 DIAGNOSIS — M99.08 SOMATIC DYSFUNCTION OF RIB CAGE REGION: ICD-10-CM

## 2024-07-18 DIAGNOSIS — G89.29 CHRONIC RIGHT HIP PAIN: Primary | ICD-10-CM

## 2024-07-18 DIAGNOSIS — M99.05 SOMATIC DYSFUNCTION OF PELVIC REGION: ICD-10-CM

## 2024-07-18 DIAGNOSIS — M99.04 SACRAL REGION SOMATIC DYSFUNCTION: ICD-10-CM

## 2024-07-18 DIAGNOSIS — M79.10 MYALGIA: ICD-10-CM

## 2024-07-18 PROCEDURE — 1160F RVW MEDS BY RX/DR IN RCRD: CPT | Mod: CPTII,S$GLB,, | Performed by: NEUROMUSCULOSKELETAL MEDICINE & OMM

## 2024-07-18 PROCEDURE — 1159F MED LIST DOCD IN RCRD: CPT | Mod: CPTII,S$GLB,, | Performed by: NEUROMUSCULOSKELETAL MEDICINE & OMM

## 2024-07-18 PROCEDURE — 98927 OSTEOPATH MANJ 5-6 REGIONS: CPT | Mod: S$GLB,,, | Performed by: NEUROMUSCULOSKELETAL MEDICINE & OMM

## 2024-07-18 PROCEDURE — 3078F DIAST BP <80 MM HG: CPT | Mod: CPTII,S$GLB,, | Performed by: NEUROMUSCULOSKELETAL MEDICINE & OMM

## 2024-07-18 PROCEDURE — 3074F SYST BP LT 130 MM HG: CPT | Mod: CPTII,S$GLB,, | Performed by: NEUROMUSCULOSKELETAL MEDICINE & OMM

## 2024-07-18 PROCEDURE — 99215 OFFICE O/P EST HI 40 MIN: CPT | Mod: 25,S$GLB,, | Performed by: NEUROMUSCULOSKELETAL MEDICINE & OMM

## 2024-07-18 PROCEDURE — 97110 THERAPEUTIC EXERCISES: CPT | Mod: S$GLB,,, | Performed by: NEUROMUSCULOSKELETAL MEDICINE & OMM

## 2024-07-18 PROCEDURE — 99999 PR PBB SHADOW E&M-EST. PATIENT-LVL III: CPT | Mod: PBBFAC,,, | Performed by: NEUROMUSCULOSKELETAL MEDICINE & OMM

## 2024-07-18 RX ORDER — MELOXICAM 15 MG/1
15 TABLET ORAL DAILY
Qty: 30 TABLET | Refills: 0 | Status: SHIPPED | OUTPATIENT
Start: 2024-07-18 | End: 2024-08-17

## 2024-08-14 NOTE — PROGRESS NOTES
Subjective:     Kiley Kate Lilliam RICHARDS    Kiley is a 34 y.o. female coming in today for right hip pain. Since last visit the pain has Improved.  Pt is compliant with HEP. She notes intermittent flares with physical activity. Pt is wearing 3mm left heel lift, notes that she doesn't wear shoes at home. Reports relief of sternal/rib pain with meloxicam. The pain is better with rest and worse with kneeling at [employer], squatting, stairs, after sitting for prolonged periods or with wearing heels/wedges.  Pt. describes the pain as a 4/10 achy pain that does not radiate. There has not been any new a fall/injury/ or traumas since last visit.  Pt. denies any new musculoskeletal complaints at this time. Pt. Has upcoming apt. In November with neurology to further address increased lower extremity muscle tone.     Office note from 7/18/24 reviewed    Joint instability? no  Mechanical locking/clicking? no  Affecting ADL's? yes  Affecting sleep? 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    Seborrheic dermatitis     Strabismus      PAST SURGICAL HISTORY:   Past Surgical History:   Procedure Laterality Date    COLONOSCOPY  2014    STRABISMUS SURGERY  05/06/1994    Denervation and extirpation of IO ou    STRABISMUS SURGERY  05/12/2010    Recession LR OD 6mm, Resection MR OD 5mm     FAMILY HISTORY:   Family History   Problem Relation Name Age of Onset    Colon cancer Mother  51    Diabetes Paternal Grandfather      Cataracts Maternal Grandmother      Melanoma Father      Hypertension Father      Breast cancer Neg Hx      Ovarian cancer Neg Hx      Lung cancer Neg Hx       SOCIAL HISTORY:   Social History     Socioeconomic History    Marital status: Single   Tobacco Use    Smoking status: Never    Smokeless tobacco: Never   Substance and Sexual Activity    Alcohol use: Yes     Comment: social, 1 drink weekly    Drug use: No    Sexual  activity: Not Currently     Partners: Male     Birth control/protection: None   Other Topics Concern    Are you pregnant or think you may be? No    Breast-feeding No     Social Determinants of Health     Financial Resource Strain: Low Risk  (7/15/2024)    Overall Financial Resource Strain (CARDIA)     Difficulty of Paying Living Expenses: Not very hard   Food Insecurity: No Food Insecurity (7/15/2024)    Hunger Vital Sign     Worried About Running Out of Food in the Last Year: Never true     Ran Out of Food in the Last Year: Never true   Physical Activity: Sufficiently Active (7/15/2024)    Exercise Vital Sign     Days of Exercise per Week: 5 days     Minutes of Exercise per Session: 60 min   Stress: No Stress Concern Present (7/15/2024)    [employer] - Occupational Stress Questionnaire     Feeling of Stress : Only a little   Housing Stability: Unknown (7/15/2024)    Housing Stability Vital Sign     Unable to Pay for Housing in the Last Year: No     MEDICATIONS:   Current Outpatient Medications:     citalopram (CELEXA) 40 MG tablet, TAKE 1 TABLET(40 MG) BY MOUTH EVERY DAY, Disp: 90 tablet, Rfl: 0    clobetasoL (CLOBEX) 0.05 % shampoo, Apply to scalp, lather, let sit 10 minutes then rinse., Disp: 118 mL, Rfl: 5    clobetasoL (OLUX) 0.05 % Foam, AAA of scalp and ears bid prn psoriasis, Disp: 50 g, Rfl: 3    erythromycin (ROMYCIN) ophthalmic ointment, Place a 1/2 inch ribbon of ointment into the lower eyelid 4x per day for 5 days, Disp: 3.5 g, Rfl: 0    meloxicam (MOBIC) 15 MG tablet, Take 1 tablet (15 mg total) by mouth once daily., Disp: 30 tablet, Rfl: 0    ALLERGIES: Review of patient's allergies indicates:  No Known Allergies    Objective:     VITAL SIGNS: /72   Pulse 68    General    Vitals reviewed.  Constitutional: She is oriented to person, place, and time. She appears well-developed and well-nourished.   Neurological: She is alert and oriented to person, place, and time.    Psychiatric: She has a normal mood and affect. Her behavior is normal.           MUSCULOSKELETAL EXAM    HIP: right HIP  The affected hip is compared to the contralateral hip.    Observation:    There is no edema, erythema, or ecchymosis in the lumbosacral region.   There is no Trendelenburg sign on either side  No obvious pelvic obliquity while standing.    No thoracolumbar scoliosis observed.    No midline skin abnormalities.  No atrophy noted in the lower limbs.  + left short leg, persistent following OMT- corrected with 5 mm left heel lift  Gait: Non-antalgic with Over supination ankle mechanics and Neutral medial arch  Improved bilateral pelvic stability with decreased bilateral hip hiking compensatory pattern noted with single leg raise,  but some instability still present  + high resting calf muscle tone bilaterally    ROM (* = with pain):  Passive hip flexion to 120° on left and 120° on right  Passive hip internal rotation to 45° on left and 45° on right  Passive hip external rotation to 45° on left and 45° on right*   Passive hip abduction to 45° on left and 45° on right    Tenderness To Palpation:  No tenderness at the ASIS, AIIS, PSIS, PIIS,pubic bones, ischial tuberosity.  No tenderness at the anterior iliac crest at the TFL muscle origin  No tenderness throughout the lumbar spine, iliolumbar region, or posterior pelvis.  No tenderness throughout the sacrum or piriformis  No tenderness over the greater trochanteric bursa or greater/lesser trochanters.  No tenderness at the glut attachments on the greater trochanter  No tenderness over proximal IT band   No tenderness at the right  hip flexor musculature.    Strength Testing (* = with pain):  Hip flexion - 5/5 on left and 5/5 on right  Hip extension - 5/5 on left and 5/5 on right  Hip adduction - 5/5 on left and 5/5 on right  Hip abduction - 5/5 on left and 5/5 on right  Knee flexion - 5/5 on left and 5/5 on right  Knee extension - 5/5 on left and 5/5  on right  Glutaeus medius - 5/5 on left and 5/5 on right    Special Tests:  Standing Trendelenburg test - negative    Seated straight leg raise - negative  Supine straight leg raise - negative  Hamstring flexibility symmetric    Log roll - negative  ABDON - positive for right iliacus tenderness  FADIR - negative  Scour test - negative  No pain with posterior hip capsule compression    ASIS compression test - negative  SI drawer test - negative   Thigh thrust test - negative     Piriformis test (Bonnet's) - negative  Quadriceps flexibility symmetric.  Luis Carlos test - negative  Archie compression test - negative    Fulcrum test - negative     Neurovascular Exam:  2+ femoral, DP, and PT pulses BL.  No skin changes, no abnormal hair distribution.  Sensation intact to light touch throughout the obturator and medial/lateral/posterior femoral cutaneous nerves.  3/4 reflexes at L4 and S1 dermatomes bilaterally  Capillary refill intact to <2 seconds in all lower limb digits.    TART (Tissue texture abnormality, Asymmetry,  Restriction of motion and/or Tenderness) changes:     Thoracic Spine   T1 Neutral   T2 Neutral   T3 Neutral   T4 Neutral   T5 Neutral   T6 Neutral   T7 Neutral   T8 Neutral   T9 Neutral   T10 Neutral   T11 NS-left,R-right   T12 NS-left,R-right     Rib cage: R11-12 TTA on right     Lumbar Spine   L1 NS-left,R-right   L2 NS-left,R-right   L3 Neutral   L4 ERS RIGHT   L5 ERS RIGHT      Pelvis:  Innominate:Right anterior rotation  Pubic bone:Right inferior pubic shear    Sacrum:Right unilateral extension    Lower extremity:neutral    Key   F= Flexed   E = Extended   R = Rotated   S = Sidebent   TTA = tissue texture abnormality     Assessment:      Encounter Diagnoses   Name Primary?    Chronic right hip pain Yes    Acquired leg length discrepancy     Myalgia     Somatic dysfunction of thoracic region     Somatic dysfunction of rib cage region     Somatic dysfunction of lumbar region     Sacral region somatic  dysfunction     Somatic dysfunction of pelvic region         Plan:   1.  Recurrent right hip pain improved with recent OMT and 3 mm left heel lift, however biomechanical restrictions of the lower kinetic chain still present  - OMT performed again today to address associated biomechanical restrictions  and HEP reviewed.   - recommend ice of 20 min at a time as needed pain control  - Recommend increasing to a  left 5 mm heel lift wear for correction of left short-leg   - Continue Meloxicam 15 mg po qday prn for pain control. Refill placed today. Pt. Advised to avoid all other NSAIDS while on this medication.  -  X-ray images of bilateral hips taken 10/30/20 (AP pelvis and frogleg lateral  bilateral views) showed no abnormalities.   -bilateral hip MRI with and without contrast from 11/23/2020 showed an anterosuperior RIGHT-sided labral tear, however no correlating physical exam findings of this today.  Underlying fibrocystic change of RIGHT femoral head neck junction, incidental.    2. OMT 5-6 regions. Oral consent obtained.  Reviewed benefits and potential side effects, including bruising at site of treatment and increased soreness for the next 24-48 hours. Pt. Instructed to increased water intake by 1 L today and tomorrow to help with any residual soreness.  - OMT indicated today due to signs and symptoms as well as local and remote somatic dysfunction findings and their related neurokinetic, lymphatic, fascial and/or arteriovenous body connections.   - OMT techniques used: Myofascial Release, Muscle Energy, High Velocity Low Amplitude, Still's Technique, and Fascial Distortion Model   - Treatment was tolerated well. Improvement noted in segmental mobility post-treatment in dysfunctional regions. There were no adverse events and no complications immediately following treatment.     3.Continue the following HEP:    A)  Pelvic clock exercises given to do from the 6-12 o'clock positions:10-15 reps, twice daily. Hand out  of exercise also given.   B) Clam shell exercises bilaterally: hold leg in abducted and externally rotated position for 5-10 seconds, repeat 5-10 times  C) Lower abdominal muscle isotonic exercises: Rotate pelvis into the 6 o'clock position and holding it to engage lower abdominal muscles. Then lift up leg, one at a time, alternating for 10-15 reps. Repeat exercises 1-2 times daily. Handout given.   D) Bilateral Psoas lunge stretch: hold stretch for 30 seconds, repeat 2-3 times, twice daily    The patient was taught a homegoing physical therapy regimen as described above. The patient demonstrated understanding of the exercises and proper technique of their execution.     4. Follow-up in 3 weeks for reevaluation    5. Patient agreeable to today's plan and all questions were answered    This note is dictated using the M*Modal Fluency Direct word recognition program. There are word recognition mistakes that are occasionally missed on review.

## 2024-08-15 ENCOUNTER — OFFICE VISIT (OUTPATIENT)
Dept: SPORTS MEDICINE | Facility: CLINIC | Age: 34
End: 2024-08-15
Payer: COMMERCIAL

## 2024-08-15 ENCOUNTER — PATIENT MESSAGE (OUTPATIENT)
Dept: SPORTS MEDICINE | Facility: CLINIC | Age: 34
End: 2024-08-15

## 2024-08-15 VITALS — HEART RATE: 68 BPM | DIASTOLIC BLOOD PRESSURE: 72 MMHG | SYSTOLIC BLOOD PRESSURE: 107 MMHG

## 2024-08-15 DIAGNOSIS — M79.10 MYALGIA: ICD-10-CM

## 2024-08-15 DIAGNOSIS — M99.04 SACRAL REGION SOMATIC DYSFUNCTION: ICD-10-CM

## 2024-08-15 DIAGNOSIS — M94.0 COSTOCHONDRITIS, ACUTE: ICD-10-CM

## 2024-08-15 DIAGNOSIS — M99.08 SOMATIC DYSFUNCTION OF RIB CAGE REGION: ICD-10-CM

## 2024-08-15 DIAGNOSIS — G89.29 CHRONIC RIGHT HIP PAIN: Primary | ICD-10-CM

## 2024-08-15 DIAGNOSIS — M99.03 SOMATIC DYSFUNCTION OF LUMBAR REGION: ICD-10-CM

## 2024-08-15 DIAGNOSIS — M21.70 ACQUIRED LEG LENGTH DISCREPANCY: ICD-10-CM

## 2024-08-15 DIAGNOSIS — M99.02 SOMATIC DYSFUNCTION OF THORACIC REGION: ICD-10-CM

## 2024-08-15 DIAGNOSIS — M25.551 CHRONIC RIGHT HIP PAIN: Primary | ICD-10-CM

## 2024-08-15 DIAGNOSIS — M99.05 SOMATIC DYSFUNCTION OF PELVIC REGION: ICD-10-CM

## 2024-08-15 PROCEDURE — 99999 PR PBB SHADOW E&M-EST. PATIENT-LVL III: CPT | Mod: PBBFAC,,, | Performed by: NEUROMUSCULOSKELETAL MEDICINE & OMM

## 2024-08-15 PROCEDURE — 1159F MED LIST DOCD IN RCRD: CPT | Mod: CPTII,S$GLB,, | Performed by: NEUROMUSCULOSKELETAL MEDICINE & OMM

## 2024-08-15 PROCEDURE — 3078F DIAST BP <80 MM HG: CPT | Mod: CPTII,S$GLB,, | Performed by: NEUROMUSCULOSKELETAL MEDICINE & OMM

## 2024-08-15 PROCEDURE — 98927 OSTEOPATH MANJ 5-6 REGIONS: CPT | Mod: S$GLB,,, | Performed by: NEUROMUSCULOSKELETAL MEDICINE & OMM

## 2024-08-15 PROCEDURE — 99214 OFFICE O/P EST MOD 30 MIN: CPT | Mod: 25,S$GLB,, | Performed by: NEUROMUSCULOSKELETAL MEDICINE & OMM

## 2024-08-15 PROCEDURE — 3074F SYST BP LT 130 MM HG: CPT | Mod: CPTII,S$GLB,, | Performed by: NEUROMUSCULOSKELETAL MEDICINE & OMM

## 2024-08-15 PROCEDURE — 1160F RVW MEDS BY RX/DR IN RCRD: CPT | Mod: CPTII,S$GLB,, | Performed by: NEUROMUSCULOSKELETAL MEDICINE & OMM

## 2024-08-16 ENCOUNTER — TELEPHONE (OUTPATIENT)
Dept: SPORTS MEDICINE | Facility: CLINIC | Age: 34
End: 2024-08-16
Payer: COMMERCIAL

## 2024-08-16 DIAGNOSIS — M25.551 CHRONIC RIGHT HIP PAIN: Primary | ICD-10-CM

## 2024-08-16 DIAGNOSIS — G89.29 CHRONIC RIGHT HIP PAIN: Primary | ICD-10-CM

## 2024-08-16 DIAGNOSIS — M94.0 COSTOCHONDRITIS, ACUTE: ICD-10-CM

## 2024-08-16 RX ORDER — MELOXICAM 15 MG/1
TABLET ORAL
Qty: 30 TABLET | Refills: 0 | OUTPATIENT
Start: 2024-08-16

## 2024-08-16 RX ORDER — MELOXICAM 15 MG/1
15 TABLET ORAL DAILY
Qty: 30 TABLET | Refills: 0 | Status: SHIPPED | OUTPATIENT
Start: 2024-08-16 | End: 2024-09-15

## 2024-08-16 NOTE — TELEPHONE ENCOUNTER
I think I saw that Dr. Kay put in another prescription for this, wanted to send this to you to double check.

## 2024-08-16 NOTE — TELEPHONE ENCOUNTER
----- Message from Phu Hernandez sent at 8/16/2024  3:35 PM CDT -----  Regarding: Pharm Auth  Contact: pt [phone]  Rx Refill/Request    Is this a Refill or New Rx:  refill    Rx Name and Strength:      - meloxicam (MOBIC) 15 MG tablet    Preferred Pharmacy with phone number:    [id number] - [place], [place] - [address] AT [facility] OF [address]   Phone: [phone]  Fax: [phone]    Communication Preference: please call pt @ [phone] with update    Additional Information: pt is calling to check the status of refill request discussed with provider during office visit

## 2024-08-31 ENCOUNTER — PATIENT MESSAGE (OUTPATIENT)
Dept: SPORTS MEDICINE | Facility: CLINIC | Age: 34
End: 2024-08-31
Payer: COMMERCIAL

## 2024-09-19 ENCOUNTER — PATIENT MESSAGE (OUTPATIENT)
Dept: PRIMARY CARE CLINIC | Facility: CLINIC | Age: 34
End: 2024-09-19
Payer: COMMERCIAL

## 2024-09-19 DIAGNOSIS — M94.0 COSTOCHONDRITIS, ACUTE: ICD-10-CM

## 2024-09-19 DIAGNOSIS — M25.551 CHRONIC RIGHT HIP PAIN: ICD-10-CM

## 2024-09-19 DIAGNOSIS — G89.29 CHRONIC RIGHT HIP PAIN: ICD-10-CM

## 2024-09-19 RX ORDER — MELOXICAM 15 MG/1
15 TABLET ORAL DAILY
Qty: 30 TABLET | Refills: 0 | Status: SHIPPED | OUTPATIENT
Start: 2024-09-19 | End: 2024-09-21

## 2024-09-20 DIAGNOSIS — G89.29 CHRONIC RIGHT HIP PAIN: ICD-10-CM

## 2024-09-20 DIAGNOSIS — M25.551 CHRONIC RIGHT HIP PAIN: ICD-10-CM

## 2024-09-20 DIAGNOSIS — M94.0 COSTOCHONDRITIS, ACUTE: ICD-10-CM

## 2024-09-21 RX ORDER — MELOXICAM 15 MG/1
TABLET ORAL
Qty: 30 TABLET | Refills: 0 | Status: SHIPPED | OUTPATIENT
Start: 2024-09-21

## 2024-10-02 NOTE — PROGRESS NOTES
"Subjective:     Kiley Kate Lilliam RICHARDS    Kiley is a 34 y.o. female coming in today for right hip pain. Since last visit the pain has Improved, though the pain is still "day by day". She is still taking meloxicam daily.  Pt is compliant with HEP. Pt is wearing 3mm left heel lift. The pain is better with rest and worse with kneeling at [employer], squatting, stairs, after sitting for prolonged periods or with wearing heels/wedges. Pt. describes the pain as a 5/10 achy pain that does not radiate. There has not been any new a fall/injury/ or traumas since last visit. Pt. denies any new musculoskeletal complaints at this time. Pt. Has upcoming apt. In November with neurology to further address increased lower extremity muscle tone.     Office note from 8/15/24 reviewed    Joint instability? no  Mechanical locking/clicking? no  Affecting ADL's? yes  Affecting sleep? 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    Seborrheic dermatitis     Strabismus      PAST SURGICAL HISTORY:   Past Surgical History:   Procedure Laterality Date    COLONOSCOPY  2014    STRABISMUS SURGERY  05/06/1994    Denervation and extirpation of IO ou    STRABISMUS SURGERY  05/12/2010    Recession LR OD 6mm, Resection MR OD 5mm     FAMILY HISTORY:   Family History   Problem Relation Name Age of Onset    Colon cancer Mother  51    Diabetes Paternal Grandfather      Cataracts Maternal Grandmother      Melanoma Father      Hypertension Father      Breast cancer Neg Hx      Ovarian cancer Neg Hx      Lung cancer Neg Hx       SOCIAL HISTORY:   Social History     Socioeconomic History    Marital status: Single   Tobacco Use    Smoking status: Never    Smokeless tobacco: Never   Substance and Sexual Activity    Alcohol use: Yes     Comment: social, 1 drink weekly    Drug use: No    Sexual activity: Not Currently     Partners: Male     Birth control/protection: None "   Other Topics Concern    Are you pregnant or think you may be? No    Breast-feeding No     Social Drivers of Health     Financial Resource Strain: Low Risk  (7/15/2024)    Overall Financial Resource Strain (CARDIA)     Difficulty of Paying Living Expenses: Not very hard   Food Insecurity: No Food Insecurity (7/15/2024)    Hunger Vital Sign     Worried About Running Out of Food in the Last Year: Never true     Ran Out of Food in the Last Year: Never true   Physical Activity: Sufficiently Active (7/15/2024)    Exercise Vital Sign     Days of Exercise per Week: 5 days     Minutes of Exercise per Session: 60 min   Stress: No Stress Concern Present (7/15/2024)    [employer] - Occupational Stress Questionnaire     Feeling of Stress : Only a little   Housing Stability: Unknown (7/15/2024)    Housing Stability Vital Sign     Unable to Pay for Housing in the Last Year: No     MEDICATIONS:   Current Outpatient Medications:     citalopram (CELEXA) 40 MG tablet, TAKE 1 TABLET(40 MG) BY MOUTH EVERY DAY, Disp: 90 tablet, Rfl: 0    clobetasoL (CLOBEX) 0.05 % shampoo, Apply to scalp, lather, let sit 10 minutes then rinse., Disp: 118 mL, Rfl: 5    clobetasoL (OLUX) 0.05 % Foam, AAA of scalp and ears bid prn psoriasis, Disp: 50 g, Rfl: 3    meloxicam (MOBIC) 15 MG tablet, TAKE 1 TABLET(15 MG) BY MOUTH DAILY, Disp: 30 tablet, Rfl: 0    erythromycin (ROMYCIN) ophthalmic ointment, Place a 1/2 inch ribbon of ointment into the lower eyelid 4x per day for 5 days, Disp: 3.5 g, Rfl: 0    ALLERGIES: Review of patient's allergies indicates:  No Known Allergies    Objective:   VITAL SIGNS: /75 (Patient Position: Sitting)   Pulse 75    General    Vitals reviewed.  Constitutional: She is oriented to person, place, and time. She appears well-developed and well-nourished.   Neurological: She is alert and oriented to person, place, and time.   Psychiatric: She has a normal mood and affect. Her behavior is  normal.           MUSCULOSKELETAL EXAM  HIP: right HIP  The affected hip is compared to the contralateral hip.    Observation:    There is no edema, erythema, or ecchymosis in the lumbosacral region.   There is no Trendelenburg sign on either side  No obvious pelvic obliquity while standing.    No thoracolumbar scoliosis observed.    No midline skin abnormalities.  No atrophy noted in the lower limbs.  + left short leg, persistent following OMT- corrected with 5 mm left heel lift  Gait: Non-antalgic with Over supination ankle mechanics and Neutral medial arch  Improved bilateral pelvic stability with decreased bilateral hip hiking compensatory pattern noted with single leg raise,  but some instability still present  + high resting calf muscle tone bilaterally    ROM (* = with pain):  Passive hip flexion to 120° on left and 120° on right  Passive hip internal rotation to 45° on left and 45° on right*  Passive hip external rotation to 45° on left and 45° on right  Passive hip abduction to 45° on left and 45° on right    Tenderness To Palpation:  No tenderness at the ASIS, AIIS, PSIS, PIIS,pubic bones, ischial tuberosity.  No tenderness at the anterior iliac crest at the TFL muscle origin  No tenderness throughout the lumbar spine, iliolumbar region, or posterior pelvis.  No tenderness throughout the sacrum or piriformis  No tenderness over the greater trochanteric bursa or greater/lesser trochanters.  No tenderness at the glut attachments on the greater trochanter  No tenderness over proximal IT band   No tenderness at the right  hip flexor musculature.    Strength Testing (* = with pain):  Hip flexion - 5/5 on left and 5/5 on right  Hip extension - 5/5 on left and 5/5 on right  Hip adduction - 5/5 on left and 5/5 on right  Hip abduction - 5/5 on left and 5/5 on right  Knee flexion - 5/5 on left and 5/5 on right  Knee extension - 5/5 on left and 5/5 on right  Glutaeus medius - 5/5 on left and 5/5 on right    Special  Tests:  Standing Trendelenburg test - negative    Seated straight leg raise - negative  Supine straight leg raise - negative  Hamstring flexibility symmetric    Log roll - negative  ABDON - negative  FADIR - positive  Scour test - negative  No pain with posterior hip capsule compression    ASIS compression test - negative  SI drawer test - negative   Thigh thrust test - negative     Piriformis test (Bonnet's) - negative  Quadriceps flexibility symmetric.  Luis Carlos test - negative  Archie compression test - negative    Fulcrum test - negative     Neurovascular Exam:  2+ femoral, DP, and PT pulses BL.  No skin changes, no abnormal hair distribution.  Sensation intact to light touch throughout the obturator and medial/lateral/posterior femoral cutaneous nerves.  3/4 reflexes at L4 and S1 dermatomes bilaterally  Capillary refill intact to <2 seconds in all lower limb digits.    TART (Tissue texture abnormality, Asymmetry,  Restriction of motion and/or Tenderness) changes:     Thoracic Spine   T1 Neutral   T2 Neutral   T3 Neutral   T4 Neutral   T5 Neutral   T6 Neutral   T7 Neutral   T8 Neutral   T9 Neutral   T10 Neutral   T11 NS-left,R-right   T12 NS-left,R-right     Rib cage:  Neutral     Lumbar Spine   L1 NS-left,R-right   L2 NS-left,R-right   L3 NS-left,R-right   L4 ERS RIGHT   L5 ERS RIGHT      Pelvis:  Innominate:Neutral  Pubic bone:Neutral    Sacrum:Neutral     Lower extremity:  TTA of right hip capsule    Key   F= Flexed   E = Extended   R = Rotated   S = Sidebent   TTA = tissue texture abnormality     Assessment:      Encounter Diagnoses   Name Primary?    Chronic right hip pain Yes    Acquired leg length discrepancy     Tear of right acetabular labrum, sequela     Myalgia     Somatic dysfunction of lumbar region     Somatic dysfunction of thoracic region     Somatic dysfunction of lower extremity         Plan:   1.  Recurrent right hip pain with improved biomechanical restrictions of the lower kinetic chain following  left 5 mm heel lift wear.  Underlying right anterior superior labral tear appreciated on prior MRI.  Given persistent symptoms, discussed option of right hip diagnostic/therapeutic ultrasound-guided CSI as next step, following evaluation with Neurology for underlying high lower extremity tone.  - OMT performed again today to address associated biomechanical restrictions  and HEP reviewed.   - recommend ice of 20 min at a time as needed pain control  - Recommend continuing left 5 mm heel lift wear for correction of left short-leg   - Continue Meloxicam 15 mg po qday prn for pain control. Refill placed today. Pt. Advised to avoid all other NSAIDS while on this medication.  -  X-ray images of bilateral hips taken 10/30/20 (AP pelvis and frogleg lateral  bilateral views) showed no abnormalities.   -bilateral hip MRI with and without contrast from 11/23/2020 showed an anterosuperior RIGHT-sided labral tear, however no correlating physical exam findings of this today. Underlying fibrocystic change of RIGHT femoral head neck junction, incidental.    2. OMT 5-6 regions. Oral consent obtained.  Reviewed benefits and potential side effects, including bruising at site of treatment and increased soreness for the next 24-48 hours. Pt. Instructed to increased water intake by 1 L today and tomorrow to help with any residual soreness.  - OMT indicated today due to signs and symptoms as well as local and remote somatic dysfunction findings and their related neurokinetic, lymphatic, fascial and/or arteriovenous body connections.   - OMT techniques used: Myofascial Release, Muscle Energy, High Velocity Low Amplitude, Still's Technique, and Fascial Distortion Model   - Treatment was tolerated well. Improvement noted in segmental mobility post-treatment in dysfunctional regions. There were no adverse events and no complications immediately following treatment.     3.Continue the following HEP:  A)  Pelvic clock exercises given to do  from the 6-12 o'clock positions:10-15 reps, twice daily. Hand out of exercise also given.   B) Clam shell exercises bilaterally: hold leg in abducted and externally rotated position for 5-10 seconds, repeat 5-10 times  C) Lower abdominal muscle isotonic exercises: Rotate pelvis into the 6 o'clock position and holding it to engage lower abdominal muscles. Then lift up leg, one at a time, alternating for 10-15 reps. Repeat exercises 1-2 times daily. Handout given.   D) Bilateral Psoas lunge stretch: hold stretch for 30 seconds, repeat 2-3 times, twice daily    The patient was taught a homegoing physical therapy regimen as described above. The patient demonstrated understanding of the exercises and proper technique of their execution.     4. Follow-up for diagnostic/therapeutic right intra-articular hip CSI, pending neurology evaluation    5. Patient agreeable to today's plan and all questions were answered    This note is dictated using the M*Modal Fluency Direct word recognition program. There are word recognition mistakes that are occasionally missed on review.

## 2024-10-03 ENCOUNTER — OFFICE VISIT (OUTPATIENT)
Dept: SPORTS MEDICINE | Facility: CLINIC | Age: 34
End: 2024-10-03
Payer: COMMERCIAL

## 2024-10-03 VITALS — DIASTOLIC BLOOD PRESSURE: 75 MMHG | SYSTOLIC BLOOD PRESSURE: 118 MMHG | HEART RATE: 75 BPM

## 2024-10-03 DIAGNOSIS — M99.03 SOMATIC DYSFUNCTION OF LUMBAR REGION: ICD-10-CM

## 2024-10-03 DIAGNOSIS — M79.10 MYALGIA: ICD-10-CM

## 2024-10-03 DIAGNOSIS — S73.191S TEAR OF RIGHT ACETABULAR LABRUM, SEQUELA: ICD-10-CM

## 2024-10-03 DIAGNOSIS — M21.70 ACQUIRED LEG LENGTH DISCREPANCY: ICD-10-CM

## 2024-10-03 DIAGNOSIS — M99.02 SOMATIC DYSFUNCTION OF THORACIC REGION: ICD-10-CM

## 2024-10-03 DIAGNOSIS — G89.29 CHRONIC RIGHT HIP PAIN: Primary | ICD-10-CM

## 2024-10-03 DIAGNOSIS — M25.551 CHRONIC RIGHT HIP PAIN: Primary | ICD-10-CM

## 2024-10-03 DIAGNOSIS — M99.06 SOMATIC DYSFUNCTION OF LOWER EXTREMITY: ICD-10-CM

## 2024-10-03 PROCEDURE — 98926 OSTEOPATH MANJ 3-4 REGIONS: CPT | Mod: S$GLB,,, | Performed by: NEUROMUSCULOSKELETAL MEDICINE & OMM

## 2024-10-03 PROCEDURE — 3078F DIAST BP <80 MM HG: CPT | Mod: CPTII,S$GLB,, | Performed by: NEUROMUSCULOSKELETAL MEDICINE & OMM

## 2024-10-03 PROCEDURE — 99999 PR PBB SHADOW E&M-EST. PATIENT-LVL III: CPT | Mod: PBBFAC,,, | Performed by: NEUROMUSCULOSKELETAL MEDICINE & OMM

## 2024-10-03 PROCEDURE — 1160F RVW MEDS BY RX/DR IN RCRD: CPT | Mod: CPTII,S$GLB,, | Performed by: NEUROMUSCULOSKELETAL MEDICINE & OMM

## 2024-10-03 PROCEDURE — 99214 OFFICE O/P EST MOD 30 MIN: CPT | Mod: 25,S$GLB,, | Performed by: NEUROMUSCULOSKELETAL MEDICINE & OMM

## 2024-10-03 PROCEDURE — 3074F SYST BP LT 130 MM HG: CPT | Mod: CPTII,S$GLB,, | Performed by: NEUROMUSCULOSKELETAL MEDICINE & OMM

## 2024-10-03 PROCEDURE — 1159F MED LIST DOCD IN RCRD: CPT | Mod: CPTII,S$GLB,, | Performed by: NEUROMUSCULOSKELETAL MEDICINE & OMM

## 2024-10-09 ENCOUNTER — TELEPHONE (OUTPATIENT)
Dept: DERMATOLOGY | Facility: CLINIC | Age: 34
End: 2024-10-09
Payer: COMMERCIAL

## 2024-10-09 NOTE — TELEPHONE ENCOUNTER
----- Message from Leela sent at 10/9/2024 10:29 AM CDT -----  Type:  Patient Returning Call    Who Called:  pt   Who Left Message for Patient:  manuelito   Does the patient know what this is regarding?:  yes   Best Call Back Number:  [phone] (home)     Additional Information:  pt states she will accept the date that was given in October but needs to know what time please advise

## 2024-11-01 ENCOUNTER — OFFICE VISIT (OUTPATIENT)
Dept: DERMATOLOGY | Facility: CLINIC | Age: 34
End: 2024-11-01
Payer: COMMERCIAL

## 2024-11-01 VITALS — WEIGHT: 132.06 LBS | BODY MASS INDEX: 23.39 KG/M2

## 2024-11-01 DIAGNOSIS — L40.8 SEBOPSORIASIS: Primary | ICD-10-CM

## 2024-11-01 RX ORDER — KETOCONAZOLE 20 MG/ML
SHAMPOO, SUSPENSION TOPICAL
Qty: 120 ML | Refills: 5 | Status: SHIPPED | OUTPATIENT
Start: 2024-11-01

## 2024-11-01 RX ORDER — TRIAMCINOLONE ACETONIDE 1 MG/G
OINTMENT TOPICAL
Qty: 80 G | Refills: 2 | Status: SHIPPED | OUTPATIENT
Start: 2024-11-01

## 2024-11-12 ENCOUNTER — OFFICE VISIT (OUTPATIENT)
Dept: NEUROLOGY | Facility: CLINIC | Age: 34
End: 2024-11-12
Payer: COMMERCIAL

## 2024-11-12 VITALS
HEART RATE: 75 BPM | OXYGEN SATURATION: 97 % | DIASTOLIC BLOOD PRESSURE: 64 MMHG | SYSTOLIC BLOOD PRESSURE: 118 MMHG | HEIGHT: 63 IN | BODY MASS INDEX: 24.16 KG/M2 | WEIGHT: 136.38 LBS

## 2024-11-12 DIAGNOSIS — M62.89 ABNORMAL INCREASED MUSCLE TONE: ICD-10-CM

## 2024-11-12 PROCEDURE — 99999 PR PBB SHADOW E&M-EST. PATIENT-LVL III: CPT | Mod: PBBFAC,,, | Performed by: INTERNAL MEDICINE

## 2024-11-12 PROCEDURE — 99204 OFFICE O/P NEW MOD 45 MIN: CPT | Mod: S$GLB,,, | Performed by: INTERNAL MEDICINE

## 2024-11-12 PROCEDURE — 3074F SYST BP LT 130 MM HG: CPT | Mod: CPTII,S$GLB,, | Performed by: INTERNAL MEDICINE

## 2024-11-12 PROCEDURE — 3078F DIAST BP <80 MM HG: CPT | Mod: CPTII,S$GLB,, | Performed by: INTERNAL MEDICINE

## 2024-11-12 PROCEDURE — 3008F BODY MASS INDEX DOCD: CPT | Mod: CPTII,S$GLB,, | Performed by: INTERNAL MEDICINE

## 2024-11-12 PROCEDURE — 1159F MED LIST DOCD IN RCRD: CPT | Mod: CPTII,S$GLB,, | Performed by: INTERNAL MEDICINE

## 2024-11-12 NOTE — PROGRESS NOTES
GENERAL NEUROLOGY VISIT   11/12/2024  History:    Patient is a 34 y.o. female with past medical history of strabismus, exophoria presenting to the clinic for evaluation of increased tone in his lower extremities.  History obtained from patient and chart review.  Patient is here with her mother today.    Patient started experiencing right hip pain in 2020.  Patient woke up one morning and noticed that she had been isolated in the right hip.  She has had ongoing symptoms since then, pain is usually fluctuating and can have pain free days.  Can not think of any triggering factors.  Pain does not radiate into lower back or down the leg.  It is a deep aching pain, denies tingling, pins and needles sensation.  Patient had MRI right hip in 2020 which showed right labral tear however this was thought to be older and not causing her active symptoms.  There is no associated weakness in the right hip.  She has been evaluated by Orthopedics thought that she had right hip tendinitis and referred her to pain management.  She underwent PT with no relief.  She was also set up with sports medicine for thought that she had increased tone in lower extremities with absent reflexes and therefore refer patient to Neurology.  Patient denies any family history of neuromuscular conditions.        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    Seborrheic dermatitis     Strabismus        Past Surgical History:   Procedure Laterality Date    COLONOSCOPY  2014    STRABISMUS SURGERY  05/06/1994    Denervation and extirpation of IO ou    STRABISMUS SURGERY  05/12/2010    Recession LR OD 6mm, Resection MR OD 5mm       Social History     Socioeconomic History    Marital status: Single   Tobacco Use    Smoking status: Never    Smokeless tobacco: Never   Substance and Sexual Activity    Alcohol use: Yes     Comment: social, 1 drink weekly    Drug use: No    Sexual activity: Not Currently     Partners: Male     Birth  control/protection: None   Other Topics Concern    Are you pregnant or think you may be? No    Breast-feeding No     Social Drivers of Health     Financial Resource Strain: Low Risk  (7/15/2024)    Overall Financial Resource Strain (CARDIA)     Difficulty of Paying Living Expenses: Not very hard   Food Insecurity: No Food Insecurity (7/15/2024)    Hunger Vital Sign     Worried About Running Out of Food in the Last Year: Never true     Ran Out of Food in the Last Year: Never true   Physical Activity: Sufficiently Active (7/15/2024)    Exercise Vital Sign     Days of Exercise per Week: 5 days     Minutes of Exercise per Session: 60 min   Stress: No Stress Concern Present (7/15/2024)    [employer] - Occupational Stress Questionnaire     Feeling of Stress : Only a little   Housing Stability: Unknown (7/15/2024)    Housing Stability Vital Sign     Unable to Pay for Housing in the Last Year: No       Review of patient's allergies indicates:  No Known Allergies    Current Outpatient Medications on File Prior to Visit   Medication Sig Dispense Refill    citalopram (CELEXA) 40 MG tablet TAKE 1 TABLET(40 MG) BY MOUTH EVERY DAY 90 tablet 0    clobetasoL (CLOBEX) 0.05 % shampoo Apply to scalp, lather, let sit 10 minutes then rinse. 118 mL 5    clobetasoL (OLUX) 0.05 % Foam AAA of scalp and ears bid prn psoriasis 50 g 3    ketoconazole (NIZORAL) 2 % shampoo Wash hair with medicated shampoo at least 2x/week - let sit on scalp at least 5 minutes prior to rinsing 120 mL 5    meloxicam (MOBIC) 15 MG tablet TAKE 1 TABLET(15 MG) BY MOUTH DAILY 30 tablet 0    triamcinolone acetonide 0.1% (KENALOG) 0.1 % ointment AAA on left ear and elbows daily to BID PRN rash 80 g 2     No current facility-administered medications on file prior to visit.        Family history:  @FAM@    Review Of Systems     Constitutional Negative for fevers, chills, weigh loss   HEENT Negative for hearing loss, dysphagia, sore throat,  "diplopia   Respiratory Negative for shortness of breath, cough    Cardiovascular Negative for chest pain, palpitations    Gastrointestinal Negative for constipation, diarrhea, early satiety    Skin Negative for rashes    Musculoskeletal Negative for joint pains, myalgias.   Neurological See Above    Psychological Negative for sleep disturbances.    Heme/Lymph Negative for easy bruising, easy bleeding    Endocrine Negative for polyuria, polydypsia     Physical Exam:     Physical Examination  /64 (BP Location: Left arm, Patient Position: Sitting)   Pulse 75   Ht 5' 3" (1.6 m)   Wt 61.8 kg (136 lb 5.7 oz)   SpO2 97%   BMI 24.15 kg/m²   Body mass index is 24.15 kg/m².      Neurological Exam  Mental Status:   Alert and oriented to name, date, location, president.   Naming/Fluency/Comprehension/Repetition intact.   Recent/remote memory, registration, attention span/concentration, fund of knowledge intact by history.    CN:   II, III, IV, VI: EOM grossly intact, strabismus noted V: intact to fine touch, temperature, pain.VII: symmetrical facial movement, nice smile, no drooping.          Motor:    ShAb ElbEx ElbFle WrE WrFle Interos  HipFl KnExt KnFlex FtDors FtPlant   Left 5 5 5 5 5 5 5 5 5 5 5 5   Right 5 5 5 5 5 5 5 5 5 5 5 5   Tone: Normal tone in UE and LE, no atrophy, no fasciculation, no tremor no pronator drift.                Reflexes:    Bicep Tricep Brachioradial Patellar Achilles   Left 2+ 2+ 2+ 3+ 2+   Right 2+ 2+ 2+ 3+ 2+   No Clonus, down going toes b/l.    Sensation: on both UEs and LEs    Light Touch: Normal.Pinprick: Normal.Proprioception: Normal.Vibration: Normal.Temperature: Normal.    Coordination:    Tremor: Absent.Dysmetria: Absent.    Gait:    Normal    Interval/Previous Work-up:   MRI right hip 2020: Findings consistent with anterosuperior RIGHT-sided labral tear.  Underlying fibrocystic change RIGHT femoral head neck junction, incidental.       Impression:   #right hip " pain    Sudden onset right hip pain, most likely musculoskeletal in nature.  Patient was not have any pattern concerning for neuropathy or myositis.  Does not have weakness.  Reflexes are hard to elicit as patient voluntarily tenses up in lower extremities, using Jendrassik maneuver, 3+ reflexes were elicited in bilateral lower extremities.  No spasticity or cogwheel rigidity was noted in lower extremities.      Plan:   - CK  - patient to follow back with sports medicine      RTC as needed    Time spent on this encounter: 45 minutes. This includes face to face time and non-face to face time preparing to see the patient (eg, review of tests), obtaining and/or reviewing separately obtained history, documenting clinical information in the electronic or other health record, independently interpreting results and communicating results to the patient/family/caregiver, or care coordinator.     Natalee Miller MD  Neurology

## 2024-12-05 ENCOUNTER — PATIENT MESSAGE (OUTPATIENT)
Dept: DERMATOLOGY | Facility: CLINIC | Age: 34
End: 2024-12-05
Payer: COMMERCIAL

## 2024-12-05 DIAGNOSIS — L40.8 SEBOPSORIASIS: Primary | ICD-10-CM

## 2024-12-09 RX ORDER — BETAMETHASONE DIPROPIONATE 0.5 MG/G
LOTION TOPICAL
Qty: 60 ML | Refills: 2 | Status: SHIPPED | OUTPATIENT
Start: 2024-12-09

## 2024-12-09 NOTE — TELEPHONE ENCOUNTER
Let's try diprolene lotion daily to BID.  Intralesional steroid injection can be very helpful for small body surface area.

## 2024-12-11 ENCOUNTER — OFFICE VISIT (OUTPATIENT)
Dept: PRIMARY CARE CLINIC | Facility: CLINIC | Age: 34
End: 2024-12-11
Payer: COMMERCIAL

## 2024-12-11 VITALS
OXYGEN SATURATION: 98 % | HEIGHT: 63 IN | SYSTOLIC BLOOD PRESSURE: 102 MMHG | DIASTOLIC BLOOD PRESSURE: 70 MMHG | HEART RATE: 73 BPM | BODY MASS INDEX: 24.2 KG/M2 | WEIGHT: 136.56 LBS

## 2024-12-11 DIAGNOSIS — F32.A DEPRESSION, UNSPECIFIED DEPRESSION TYPE: ICD-10-CM

## 2024-12-11 DIAGNOSIS — M94.0 COSTOCHONDRITIS, ACUTE: ICD-10-CM

## 2024-12-11 DIAGNOSIS — G89.29 CHRONIC RIGHT HIP PAIN: ICD-10-CM

## 2024-12-11 DIAGNOSIS — Z23 NEED FOR VACCINATION: ICD-10-CM

## 2024-12-11 DIAGNOSIS — M76.891 TENDINITIS OF RIGHT HIP: ICD-10-CM

## 2024-12-11 DIAGNOSIS — M25.551 CHRONIC RIGHT HIP PAIN: ICD-10-CM

## 2024-12-11 DIAGNOSIS — Z00.00 ANNUAL PHYSICAL EXAM: Primary | ICD-10-CM

## 2024-12-11 PROCEDURE — 3078F DIAST BP <80 MM HG: CPT | Mod: CPTII,S$GLB,, | Performed by: STUDENT IN AN ORGANIZED HEALTH CARE EDUCATION/TRAINING PROGRAM

## 2024-12-11 PROCEDURE — 1159F MED LIST DOCD IN RCRD: CPT | Mod: CPTII,S$GLB,, | Performed by: STUDENT IN AN ORGANIZED HEALTH CARE EDUCATION/TRAINING PROGRAM

## 2024-12-11 PROCEDURE — 3008F BODY MASS INDEX DOCD: CPT | Mod: CPTII,S$GLB,, | Performed by: STUDENT IN AN ORGANIZED HEALTH CARE EDUCATION/TRAINING PROGRAM

## 2024-12-11 PROCEDURE — 90656 IIV3 VACC NO PRSV 0.5 ML IM: CPT | Mod: S$GLB,,, | Performed by: STUDENT IN AN ORGANIZED HEALTH CARE EDUCATION/TRAINING PROGRAM

## 2024-12-11 PROCEDURE — 90471 IMMUNIZATION ADMIN: CPT | Mod: S$GLB,,, | Performed by: STUDENT IN AN ORGANIZED HEALTH CARE EDUCATION/TRAINING PROGRAM

## 2024-12-11 PROCEDURE — 99214 OFFICE O/P EST MOD 30 MIN: CPT | Mod: 25,S$GLB,, | Performed by: STUDENT IN AN ORGANIZED HEALTH CARE EDUCATION/TRAINING PROGRAM

## 2024-12-11 PROCEDURE — 99999 PR PBB SHADOW E&M-EST. PATIENT-LVL IV: CPT | Mod: PBBFAC,,, | Performed by: STUDENT IN AN ORGANIZED HEALTH CARE EDUCATION/TRAINING PROGRAM

## 2024-12-11 PROCEDURE — 3074F SYST BP LT 130 MM HG: CPT | Mod: CPTII,S$GLB,, | Performed by: STUDENT IN AN ORGANIZED HEALTH CARE EDUCATION/TRAINING PROGRAM

## 2024-12-11 PROCEDURE — 1160F RVW MEDS BY RX/DR IN RCRD: CPT | Mod: CPTII,S$GLB,, | Performed by: STUDENT IN AN ORGANIZED HEALTH CARE EDUCATION/TRAINING PROGRAM

## 2024-12-11 RX ORDER — MELOXICAM 15 MG/1
15 TABLET ORAL DAILY PRN
Qty: 30 TABLET | Refills: 3 | Status: SHIPPED | OUTPATIENT
Start: 2024-12-11

## 2024-12-11 RX ORDER — CITALOPRAM 40 MG/1
40 TABLET, FILM COATED ORAL DAILY
Qty: 90 TABLET | Refills: 3 | Status: SHIPPED | OUTPATIENT
Start: 2024-12-11

## 2024-12-11 NOTE — PROGRESS NOTES
Two pt identifier verified. Pt tolerated well. Advised pt to wait 15 min post immunization. Pt verbalized understanding.    Kayla LOPEZ

## 2024-12-11 NOTE — PROGRESS NOTES
Assessment:       1. Annual physical exam    2. Depression, unspecified depression type    3. Tendinitis of right hip    4. Costochondritis, acute    5. Chronic right hip pain    6. Need for vaccination           Plan:     Assessment & Plan    IMPRESSION:  - Reviewed patient's ongoing hip pain, potentially related to psoriasis and autoimmune factors  - Noted normal neurological exam results from previous visit  - Considered CK levels requested by neurologist Kieraner  - Assessed costochondritis as intermittent issue, occurring 2-3 times per year  - Evaluated current medications, including Maisha and meloxicam    PSORIATIC JUVENILE ARTHROPATHY:  - Continued Maisha 40 mg daily.  - Refilled Maisha.  - Ordered CK test along with other labs.    HIP PAIN AND COSTOCHONDRITIS:  - Continued meloxicam as needed for hip pain or costochondritis flare-ups.    IMMUNIZATION:  - No specific actions mentioned in the plan.    ORTHOPEDIC JOINT IMPLANTS:  - No specific actions mentioned in the plan.             Annual physical exam  -     CBC Auto Differential; Future; Expected date: 12/11/2024  -     Lipid Panel; Future; Expected date: 12/11/2024  -     Comprehensive Metabolic Panel; Future; Expected date: 12/11/2024  -     Hemoglobin A1C; Future; Expected date: 12/11/2024  -     TSH; Future; Expected date: 12/11/2024  -     CK; Future; Expected date: 12/11/2024    Depression, unspecified depression type  -     citalopram (CELEXA) 40 MG tablet; Take 1 tablet (40 mg total) by mouth once daily.  Dispense: 90 tablet; Refill: 3  -     CBC Auto Differential; Future; Expected date: 12/11/2024  -     Lipid Panel; Future; Expected date: 12/11/2024  -     Comprehensive Metabolic Panel; Future; Expected date: 12/11/2024  -     Hemoglobin A1C; Future; Expected date: 12/11/2024  -     TSH; Future; Expected date: 12/11/2024  -     CK; Future; Expected date: 12/11/2024    Tendinitis of right hip    Costochondritis, acute  -     meloxicam (MOBIC) 15 MG  tablet; Take 1 tablet (15 mg total) by mouth daily as needed for Pain (hip pain or costocondritis).  Dispense: 30 tablet; Refill: 3  -     CBC Auto Differential; Future; Expected date: 12/11/2024  -     Lipid Panel; Future; Expected date: 12/11/2024  -     Comprehensive Metabolic Panel; Future; Expected date: 12/11/2024  -     Hemoglobin A1C; Future; Expected date: 12/11/2024  -     TSH; Future; Expected date: 12/11/2024  -     CK; Future; Expected date: 12/11/2024    Chronic right hip pain  -     meloxicam (MOBIC) 15 MG tablet; Take 1 tablet (15 mg total) by mouth daily as needed for Pain (hip pain or costocondritis).  Dispense: 30 tablet; Refill: 3    Need for vaccination  -     influenza (Flulaval, Fluzone, Fluarix) 45 mcg/0.5 mL IM vaccine (> or = 6 mo) 0.5 mL                This note was generated with the assistance of ambient listening technology. Verbal consent was obtained by the patient and accompanying visitor(s) for the recording of patient appointment to facilitate this note. I attest to having reviewed and edited the generated note for accuracy, though some syntax or spelling errors may persist. Please contact the author of this note for any clarification.      Subjective:           Patient ID: Kiley Vyas   Age:  34 y.o.  Sex: female     Chief Complaint:   Annual Exam      History of Present Illness:    Kiley Vyas is a 34 y.o. female who presents today with a chief complaint of Annual Exam  .    History of Present Illness    CHIEF COMPLAINT:  Kiley presents today for follow-up.    MUSCULOSKELETAL CONCERNS:  She reports ongoing hip pain for three years without relief. She has been evaluated by sports medicine and is currently using a heel lift in her left shoe. The sports medicine specialist suggested a possible autoimmune connection to her hip issue, particularly in relation to her psoriasis. She experiences costochondritis episodes 2-3 times per year, which have been effectively  treated with Medrol Dosepak or meloxicam.    NEUROLOGICAL EVALUATION:  Neurological evaluation was performed due to concerns about reflexes, but results were reported as normal. She has not completed the previously ordered labs, including the CK test requested by the neurologist, due to reported busy schedule.    OPHTHALMOLOGIC ISSUE:  She experienced a corneal abrasion in September while packing her book bag early in the morning. Throughout the day, she experienced increasing discomfort and pain. By the end of the school day, her eye was swollen and painful, prompting her to seek medical attention. She was prescribed a topical cream or salve, which she applied for approximately one week, resulting in resolution of the symptoms.    MEDICATIONS:  She is currently taking Maisha 40 mg. She also takes Meloxicam as needed for inflammation, particularly for hip pain and costochondritis.    OCCUPATION:  She is employed as a  at [employer] and is actively involved in coaching swimming.      ROS:  Eyes: +eye pain  Cardiovascular: +chest pain  Musculoskeletal: +joint pain           Review of Systems   Constitutional:  Negative for activity change and unexpected weight change.   HENT:  Negative for hearing loss, rhinorrhea and trouble swallowing.    Eyes:  Negative for discharge and visual disturbance.   Respiratory:  Negative for chest tightness and wheezing.    Cardiovascular:  Negative for chest pain and palpitations.   Gastrointestinal:  Negative for constipation, diarrhea and vomiting.   Endocrine: Negative for polydipsia and polyuria.   Genitourinary:  Negative for difficulty urinating, dysuria, hematuria and menstrual problem.   Musculoskeletal:  Negative for arthralgias, joint swelling and neck pain.   Neurological:  Negative for weakness and headaches.   Psychiatric/Behavioral:  Negative for confusion and dysphoric mood.            Objective:        Vitals:    12/11/24 1327   BP:  "102/70   BP Location: Right arm   Patient Position: Sitting   Pulse: 73   SpO2: 98%   Weight: 62 kg (136 lb 9.2 oz)   Height: 5' 3" (1.6 m)       Body mass index is 24.19 kg/m².      Physical Exam  Constitutional:       General: She is not in acute distress.     Appearance: Normal appearance.      Comments: As per BMI.   HENT:      Head: Normocephalic.      Right Ear: External ear normal.      Left Ear: External ear normal.      Mouth/Throat:      Mouth: Mucous membranes are moist.   Eyes:      Extraocular Movements: Extraocular movements intact.      Conjunctiva/sclera: Conjunctivae normal.   Cardiovascular:      Rate and Rhythm: Normal rate and regular rhythm.      Heart sounds: No murmur heard.     No gallop.   Pulmonary:      Effort: Pulmonary effort is normal. No respiratory distress.      Breath sounds: Normal breath sounds.   Abdominal:      General: Abdomen is flat. Bowel sounds are normal. There is no distension.      Palpations: Abdomen is soft.   Musculoskeletal:         General: No swelling or deformity.   Skin:     General: Skin is warm.      Capillary Refill: Capillary refill takes less than 2 seconds.      Coloration: Skin is not jaundiced.   Neurological:      General: No focal deficit present.      Mental Status: She is alert and oriented to person, place, and time.      Motor: No weakness.   Psychiatric:         Mood and Affect: Mood normal.         Physical Exam                    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    Seborrheic dermatitis     Strabismus        Lab Results   Component Value Date     11/10/2021    K 4.4 11/10/2021     11/10/2021    CO2 22 (L) 11/10/2021    BUN 9 11/10/2021    CREATININE 0.8 11/10/2021    ANIONGAP 10 11/10/2021     No results found for: "HGBA1C"  No results found for: "BNP", "BNPTRIAGEBLO"    Lab Results   Component Value Date    WBC 7.45 06/21/2022    HGB 13.5 06/21/2022    HCT 40.3 06/21/2022     " 06/21/2022    GRAN 3.8 06/21/2022    GRAN 51.1 06/21/2022     Lab Results   Component Value Date    CHOL 194 11/10/2021    HDL 61 11/10/2021    LDLCALC 115.8 11/10/2021    TRIG 86 11/10/2021        Outpatient Encounter Medications as of 12/11/2024   Medication Sig Dispense Refill    betamethasone dipropionate (DIPROLENE) 0.05 % lotion aaa scalp QHS prn itch 60 mL 2    clobetasoL (CLOBEX) 0.05 % shampoo Apply to scalp, lather, let sit 10 minutes then rinse. 118 mL 5    clobetasoL (OLUX) 0.05 % Foam AAA of scalp and ears bid prn psoriasis 50 g 3    ketoconazole (NIZORAL) 2 % shampoo Wash hair with medicated shampoo at least 2x/week - let sit on scalp at least 5 minutes prior to rinsing 120 mL 5    triamcinolone acetonide 0.1% (KENALOG) 0.1 % ointment AAA on left ear and elbows daily to BID PRN rash 80 g 2    [DISCONTINUED] citalopram (CELEXA) 40 MG tablet TAKE 1 TABLET(40 MG) BY MOUTH EVERY DAY 90 tablet 0    [DISCONTINUED] meloxicam (MOBIC) 15 MG tablet TAKE 1 TABLET(15 MG) BY MOUTH DAILY 30 tablet 0    citalopram (CELEXA) 40 MG tablet Take 1 tablet (40 mg total) by mouth once daily. 90 tablet 3    meloxicam (MOBIC) 15 MG tablet Take 1 tablet (15 mg total) by mouth daily as needed for Pain (hip pain or costocondritis). 30 tablet 3     Facility-Administered Encounter Medications as of 12/11/2024   Medication Dose Route Frequency Provider Last Rate Last Admin    influenza (Flulaval, Fluzone, Fluarix) 45 mcg/0.5 mL IM vaccine (> or = 6 mo) 0.5 mL  0.5 mL Intramuscular 1 time in Clinic/HOD

## 2024-12-11 NOTE — PATIENT INSTRUCTIONS
Assessment & Plan    IMPRESSION:  - Reviewed patient's ongoing hip pain, potentially related to psoriasis and autoimmune factors  - Noted normal neurological exam results from previous visit  - Considered CK levels requested by neurologist Alycia  - Assessed costochondritis as intermittent issue, occurring 2-3 times per year  - Evaluated current medications, including Maisha and meloxicam    PSORIATIC JUVENILE ARTHROPATHY:  - Continued Maisha 40 mg daily.  - Refilled Maisha.  - Ordered CK test along with other labs.    HIP PAIN AND COSTOCHONDRITIS:  - Continued meloxicam as needed for hip pain or costochondritis flare-ups.    IMMUNIZATION:  - No specific actions mentioned in the plan.    ORTHOPEDIC JOINT IMPLANTS:  - No specific actions mentioned in the plan.

## 2024-12-19 ENCOUNTER — PATIENT MESSAGE (OUTPATIENT)
Dept: SPORTS MEDICINE | Facility: CLINIC | Age: 34
End: 2024-12-19
Payer: COMMERCIAL

## 2024-12-23 ENCOUNTER — PATIENT MESSAGE (OUTPATIENT)
Dept: SPORTS MEDICINE | Facility: CLINIC | Age: 34
End: 2024-12-23
Payer: COMMERCIAL

## 2024-12-24 DIAGNOSIS — F32.A DEPRESSION, UNSPECIFIED DEPRESSION TYPE: ICD-10-CM

## 2024-12-24 RX ORDER — CITALOPRAM 40 MG/1
40 TABLET, FILM COATED ORAL
Qty: 90 TABLET | Refills: 3 | OUTPATIENT
Start: 2024-12-24

## 2024-12-24 NOTE — TELEPHONE ENCOUNTER
Refill Decision Note   Kiley Vyas  is requesting a refill authorization.  Brief Assessment and Rationale for Refill:  Quick Discontinue     Medication Therapy Plan:  Receipt confirmed by pharmacy (12/11/2024  1:55 PM CST)      Comments:     Note composed:7:00 AM 12/24/2024

## 2025-01-02 ENCOUNTER — OFFICE VISIT (OUTPATIENT)
Dept: OBSTETRICS AND GYNECOLOGY | Facility: CLINIC | Age: 35
End: 2025-01-02
Payer: COMMERCIAL

## 2025-01-02 VITALS
WEIGHT: 138.25 LBS | BODY MASS INDEX: 24.5 KG/M2 | DIASTOLIC BLOOD PRESSURE: 70 MMHG | SYSTOLIC BLOOD PRESSURE: 102 MMHG | HEIGHT: 63 IN

## 2025-01-02 DIAGNOSIS — Z01.419 ENCOUNTER FOR ANNUAL ROUTINE GYNECOLOGICAL EXAMINATION: Primary | ICD-10-CM

## 2025-01-02 PROCEDURE — 99395 PREV VISIT EST AGE 18-39: CPT | Mod: S$GLB,,, | Performed by: OBSTETRICS & GYNECOLOGY

## 2025-01-02 PROCEDURE — 1160F RVW MEDS BY RX/DR IN RCRD: CPT | Mod: CPTII,S$GLB,, | Performed by: OBSTETRICS & GYNECOLOGY

## 2025-01-02 PROCEDURE — 99999 PR PBB SHADOW E&M-EST. PATIENT-LVL III: CPT | Mod: PBBFAC,,, | Performed by: OBSTETRICS & GYNECOLOGY

## 2025-01-02 PROCEDURE — 3008F BODY MASS INDEX DOCD: CPT | Mod: CPTII,S$GLB,, | Performed by: OBSTETRICS & GYNECOLOGY

## 2025-01-02 PROCEDURE — 1159F MED LIST DOCD IN RCRD: CPT | Mod: CPTII,S$GLB,, | Performed by: OBSTETRICS & GYNECOLOGY

## 2025-01-02 PROCEDURE — 3078F DIAST BP <80 MM HG: CPT | Mod: CPTII,S$GLB,, | Performed by: OBSTETRICS & GYNECOLOGY

## 2025-01-02 PROCEDURE — 3074F SYST BP LT 130 MM HG: CPT | Mod: CPTII,S$GLB,, | Performed by: OBSTETRICS & GYNECOLOGY

## 2025-01-02 NOTE — PROGRESS NOTES
SUBJECTIVE:     Chief Complaint: Annual Exam       History of Present Illness:  Annual Exam  Patient presents for annual exam.   She c/o nothing today.  LMP: 24  She denies any vd, vb, dyspareunia, dysuria, depression, anxiety.  Last pap was in  and was neg. HPV neg.  Birth Control: declines  declines STD testing.     GYN screening history: denies  Mammogram history: na  Colonoscopy history: na  Dexa history: na     FH:   Breast cancer: none  Colon cancer: mother age 51  Ovarian cancer: none    Review of patient's allergies indicates:  No Known Allergies    Past Medical History:   Diagnosis Date    Depression     Exophoria 2013    Seborrheic dermatitis     Strabismus      Past Surgical History:   Procedure Laterality Date    COLONOSCOPY  2014    STRABISMUS SURGERY  1994    Denervation and extirpation of IO ou    STRABISMUS SURGERY  2010    Recession LR OD 6mm, Resection MR OD 5mm     OB History          0    Para   0    Term   0       0    AB   0    Living   0         SAB   0    IAB   0    Ectopic   0    Multiple   0    Live Births   0               Family History   Problem Relation Name Age of Onset    Diabetes Paternal Grandfather      Cataracts Maternal Grandmother      Melanoma Father      Hypertension Father      Colon cancer Mother  51    Breast cancer Neg Hx      Ovarian cancer Neg Hx      Lung cancer Neg Hx      Cervical cancer Neg Hx      Uterine cancer Neg Hx       Social History     Tobacco Use    Smoking status: Never    Smokeless tobacco: Never   Substance Use Topics    Alcohol use: Yes     Comment: social, 1 drink weekly    Drug use: No       Current Outpatient Medications   Medication Sig    betamethasone dipropionate (DIPROLENE) 0.05 % lotion aaa scalp QHS prn itch    citalopram (CELEXA) 40 MG tablet Take 1 tablet (40 mg total) by mouth once daily.    clobetasoL (CLOBEX) 0.05 % shampoo Apply to scalp, lather, let sit 10 minutes then rinse.    clobetasoL  (OLUX) 0.05 % Foam AAA of scalp and ears bid prn psoriasis    ketoconazole (NIZORAL) 2 % shampoo Wash hair with medicated shampoo at least 2x/week - let sit on scalp at least 5 minutes prior to rinsing    meloxicam (MOBIC) 15 MG tablet Take 1 tablet (15 mg total) by mouth daily as needed for Pain (hip pain or costocondritis).    triamcinolone acetonide 0.1% (KENALOG) 0.1 % ointment AAA on left ear and elbows daily to BID PRN rash     No current facility-administered medications for this visit.       Review of Systems:  GENERAL: No fever, chills, fatigability or weight loss.  CARDIOVASCULAR: No chest pain. No palpitations.  RESPIRATORY: No SOB, no wheezing.  BREAST: Denies pain. No lumps. No discharge.  VULVAR: No pain, no lesions and no itching.  VAGINAL: No relaxation, no itching, no discharge, no abnormal bleeding and no lesions.  ABDOMEN: No abdominal pain. Denies nausea. Denies vomiting. No diarrhea. No constipation  URINARY: No incontinence, no nocturia, no frequency and no dysuria.  NEUROLOGICAL: No headaches. No vision changes.       OBJECTIVE:     Vitals:    01/02/25 1113   BP: 102/70       Patient verbally consents to pelvic and breast exams. Female chaperone present for exams.   Physical Exam:  Gen: NAD, well developed, well-nourished  HEENT: Normocephalic, atraumatic  Eyes: EOM nl, conjuntivae normal  Neck: ROM normal, no thyromegaly  Respiratory: Effort normal   Abd: soft, nontender, no masses palpated  Breast: Normal bilaterally, no masses, lesions or tenderness. No nipple discharge on expression, no lymphadenopathy bilaterally.  SSE:  Vulva: no lesions or rashes  Cervix: No lesions noted, nonfriable, no vaginal discharge or vaginal bleeding noted  BME:   Cervix: No CMT  Adnexa: nl bilaterally, no masses or fullness palpated  Uterus: normal, nonenlarged  Musculoskeletal: normal ROM  Neuro: alert, AAOx3  Skin: warm and dry  Psych: mood/affect nl, behavior normal, judgement normal, thought content  normal        ASSESSMENT:       ICD-10-CM ICD-9-CM    1. Encounter for annual routine gynecological examination  Z01.419 V72.31 Liquid-Based Pap Smear, Screening      HPV High Risk Genotypes, PCR             Plan:      Kiley was seen today for annual exam.    Diagnoses and all orders for this visit:    Encounter for annual routine gynecological examination  -     Liquid-Based Pap Smear, Screening  -     HPV High Risk Genotypes, PCR        Orders Placed This Encounter   Procedures    HPV High Risk Genotypes, PCR       Patient was counseled today on ACS guidelines and recommendations for yearly pelvic exams, mammograms and monthly self breast exams; to see her PCP for other health maintenance.      Follow up in one year for annual, or prn.    Julie R Jeansonne

## 2025-05-13 ENCOUNTER — PATIENT MESSAGE (OUTPATIENT)
Dept: DERMATOLOGY | Facility: CLINIC | Age: 35
End: 2025-05-13
Payer: COMMERCIAL

## 2025-07-19 ENCOUNTER — PATIENT MESSAGE (OUTPATIENT)
Dept: SPORTS MEDICINE | Facility: CLINIC | Age: 35
End: 2025-07-19
Payer: COMMERCIAL

## 2025-09-04 ENCOUNTER — TELEPHONE (OUTPATIENT)
Dept: OPTOMETRY | Facility: CLINIC | Age: 35
End: 2025-09-04
Payer: COMMERCIAL